# Patient Record
Sex: FEMALE | URBAN - METROPOLITAN AREA
[De-identification: names, ages, dates, MRNs, and addresses within clinical notes are randomized per-mention and may not be internally consistent; named-entity substitution may affect disease eponyms.]

---

## 2018-11-07 ENCOUNTER — APPOINTMENT (RX ONLY)
Dept: URBAN - METROPOLITAN AREA CLINIC 106 | Facility: CLINIC | Age: 38
Setting detail: DERMATOLOGY
End: 2018-11-07

## 2018-11-07 DIAGNOSIS — L30.9 DERMATITIS, UNSPECIFIED: ICD-10-CM

## 2018-11-07 DIAGNOSIS — D18.0 HEMANGIOMA: ICD-10-CM

## 2018-11-07 DIAGNOSIS — L70.0 ACNE VULGARIS: ICD-10-CM

## 2018-11-07 DIAGNOSIS — D22 MELANOCYTIC NEVI: ICD-10-CM

## 2018-11-07 PROBLEM — D18.01 HEMANGIOMA OF SKIN AND SUBCUTANEOUS TISSUE: Status: ACTIVE | Noted: 2018-11-07

## 2018-11-07 PROBLEM — D22.39 MELANOCYTIC NEVI OF OTHER PARTS OF FACE: Status: ACTIVE | Noted: 2018-11-07

## 2018-11-07 PROBLEM — D22.5 MELANOCYTIC NEVI OF TRUNK: Status: ACTIVE | Noted: 2018-11-07

## 2018-11-07 PROBLEM — D22.62 MELANOCYTIC NEVI OF LEFT UPPER LIMB, INCLUDING SHOULDER: Status: ACTIVE | Noted: 2018-11-07

## 2018-11-07 PROCEDURE — 99203 OFFICE O/P NEW LOW 30 MIN: CPT

## 2018-11-07 PROCEDURE — ? COUNSELING

## 2018-11-07 ASSESSMENT — LOCATION SIMPLE DESCRIPTION DERM
LOCATION SIMPLE: RIGHT CHEEK
LOCATION SIMPLE: RIGHT UPPER ARM
LOCATION SIMPLE: LEFT CHEEK
LOCATION SIMPLE: LEFT POSTERIOR UPPER ARM
LOCATION SIMPLE: RIGHT POSTERIOR UPPER ARM
LOCATION SIMPLE: ABDOMEN
LOCATION SIMPLE: CHEST
LOCATION SIMPLE: LEFT THIGH
LOCATION SIMPLE: LEFT UPPER ARM
LOCATION SIMPLE: UPPER BACK
LOCATION SIMPLE: RIGHT THIGH
LOCATION SIMPLE: RIGHT SUPERIOR EYELID

## 2018-11-07 ASSESSMENT — LOCATION DETAILED DESCRIPTION DERM
LOCATION DETAILED: LEFT PROXIMAL POSTERIOR UPPER ARM
LOCATION DETAILED: RIGHT LATERAL BUCCAL CHEEK
LOCATION DETAILED: RIGHT ANTERIOR PROXIMAL UPPER ARM
LOCATION DETAILED: LEFT ANTERIOR PROXIMAL UPPER ARM
LOCATION DETAILED: LEFT ANTERIOR PROXIMAL THIGH
LOCATION DETAILED: RIGHT LATERAL SUPERIOR EYELID
LOCATION DETAILED: PERIUMBILICAL SKIN
LOCATION DETAILED: LEFT LATERAL SUPERIOR CHEST
LOCATION DETAILED: SUPERIOR THORACIC SPINE
LOCATION DETAILED: LEFT CENTRAL BUCCAL CHEEK
LOCATION DETAILED: RIGHT PROXIMAL POSTERIOR UPPER ARM
LOCATION DETAILED: UPPER STERNUM
LOCATION DETAILED: RIGHT ANTERIOR DISTAL THIGH

## 2018-11-07 ASSESSMENT — LOCATION ZONE DERM
LOCATION ZONE: ARM
LOCATION ZONE: LEG
LOCATION ZONE: FACE
LOCATION ZONE: TRUNK
LOCATION ZONE: EYELID

## 2018-11-07 NOTE — PROCEDURE: COUNSELING
Detail Level: Simple
Patient Specific Counseling (Will Not Stick From Patient To Patient): Allergic contact dermatitis on eyelids - may try use test on inner arms for products to test. Use OTC hydrocortisone BID for one week only then stop.  Consider patch testing at [facility]
Detail Level: Detailed
Detail Level: Zone
Tetracycline Counseling: Patient counseled regarding possible photosensitivity and increased risk for sunburn. Patient instructed to avoid sunlight, if possible. When exposed to sunlight, patients should wear protective clothing, sunglasses, and sunscreen. The patient was instructed to call the office immediately if the following severe adverse effects occur:  hearing changes, easy bruising/bleeding, severe headache, or vision changes. The patient verbalized understanding of the proper use and possible adverse effects of tetracycline. All of the patient's questions and concerns were addressed. Patient understands to avoid pregnancy while on therapy due to potential birth defects.
Minocycline Counseling: Patient advised regarding possible photosensitivity and discoloration of the teeth, skin, lips, tongue and gums. Patient instructed to avoid sunlight, if possible. When exposed to sunlight, patients should wear protective clothing, sunglasses, and sunscreen. The patient was instructed to call the office immediately if the following severe adverse effects occur:  hearing changes, easy bruising/bleeding, severe headache, or vision changes. The patient verbalized understanding of the proper use and possible adverse effects of minocycline. All of the patient's questions and concerns were addressed.
Spironolactone Pregnancy And Lactation Text: This medication can cause feminization of the male fetus and should be avoided during pregnancy. The active metabolite is also found in breast milk.
Azithromycin Counseling:  I discussed with the patient the risks of azithromycin including but not limited to GI upset, allergic reaction, drug rash, diarrhea, and yeast infections.
Use Enhanced Medication Counseling?: No
Erythromycin Counseling:  I discussed with the patient the risks of erythromycin including but not limited to GI upset, allergic reaction, drug rash, diarrhea, increase in liver enzymes, and yeast infections.
Azithromycin Pregnancy And Lactation Text: This medication is considered safe during pregnancy and is also secreted in breast milk.
Dapsone Pregnancy And Lactation Text: This medication is Pregnancy Category C and is not considered safe during pregnancy or breast feeding.
Tazorac Pregnancy And Lactation Text: This medication is not safe during pregnancy. It is unknown if this medication is excreted in breast milk.
Spironolactone Counseling: Patient advised regarding risks of diarrhea, abdominal pain, hyperkalemia, birth defects (for female patients), liver toxicity and renal toxicity. The patient may need blood work to monitor liver and kidney function and potassium levels while on therapy. The patient verbalized understanding of the proper use and possible adverse effects of spironolactone. All of the patient's questions and concerns were addressed.
Bactrim Counseling:  I discussed with the patient the risks of sulfa antibiotics including but not limited to GI upset, allergic reaction, drug rash, diarrhea, dizziness, photosensitivity, and yeast infections. Rarely, more serious reactions can occur including but not limited to aplastic anemia, agranulocytosis, methemoglobinemia, blood dyscrasias, liver or kidney failure, lung infiltrates or desquamative/blistering drug rashes.
Topical Clindamycin Pregnancy And Lactation Text: This medication is Pregnancy Category B and is considered safe during pregnancy. It is unknown if it is excreted in breast milk.
Bactrim Pregnancy And Lactation Text: This medication is Pregnancy Category D and is known to cause fetal risk. It is also excreted in breast milk.
Isotretinoin Pregnancy And Lactation Text: This medication is Pregnancy Category X and is considered extremely dangerous during pregnancy. It is unknown if it is excreted in breast milk.
Tazorac Counseling:  Patient advised that medication is irritating and drying. Patient may need to apply sparingly and wash off after an hour before eventually leaving it on overnight. The patient verbalized understanding of the proper use and possible adverse effects of tazorac. All of the patient's questions and concerns were addressed.
Birth Control Pills Counseling: Birth Control Pill Counseling: I discussed with the patient the potential side effects of OCPs including but not limited to increased risk of stroke, heart attack, thrombophlebitis, deep venous thrombosis, hepatic adenomas, breast changes, GI upset, headaches, and depression. The patient verbalized understanding of the proper use and possible adverse effects of OCPs. All of the patient's questions and concerns were addressed.
Doxycycline Pregnancy And Lactation Text: This medication is Pregnancy Category D and not consider safe during pregnancy. It is also excreted in breast milk but is considered safe for shorter treatment courses.
Isotretinoin Counseling: Patient should get monthly blood tests, not donate blood, not drive at night if vision affected, not share medication, and not undergo elective surgery for 6 months after tx completed. Side effects reviewed, pt to contact office should one occur.
Topical Retinoid Pregnancy And Lactation Text: This medication is Pregnancy Category C. It is unknown if this medication is excreted in breast milk.
Benzoyl Peroxide Pregnancy And Lactation Text: This medication is Pregnancy Category C. It is unknown if benzoyl peroxide is excreted in breast milk.
Minocycline Pregnancy And Lactation Text: This medication is Pregnancy Category D and not consider safe during pregnancy. It is also excreted in breast milk.
High Dose Vitamin A Pregnancy And Lactation Text: High dose vitamin A therapy is contraindicated during pregnancy and breast feeding.
Topical Retinoid counseling:  Patient advised to apply a pea-sized amount only at bedtime and wait 30 minutes after washing their face before applying. If too drying, patient may add a non-comedogenic moisturizer. The patient verbalized understanding of the proper use and possible adverse effects of retinoids. All of the patient's questions and concerns were addressed.
Patient Specific Counseling (Will Not Stick From Patient To Patient): Pt recently been using skin rejuvenation product on her chest with irritation and breakout.
Erythromycin Pregnancy And Lactation Text: This medication is Pregnancy Category B and is considered safe during pregnancy. It is also excreted in breast milk.
Topical Clindamycin Counseling: Patient counseled that this medication may cause skin irritation or allergic reactions. In the event of skin irritation, the patient was advised to reduce the amount of the drug applied or use it less frequently. The patient verbalized understanding of the proper use and possible adverse effects of clindamycin. All of the patient's questions and concerns were addressed.
Dapsone Counseling: I discussed with the patient the risks of dapsone including but not limited to hemolytic anemia, agranulocytosis, rashes, methemoglobinemia, kidney failure, peripheral neuropathy, headaches, GI upset, and liver toxicity. Patients who start dapsone require monitoring including baseline LFTs and weekly CBCs for the first month, then every month thereafter. The patient verbalized understanding of the proper use and possible adverse effects of dapsone. All of the patient's questions and concerns were addressed.
Doxycycline Counseling:  Patient counseled regarding possible photosensitivity and increased risk for sunburn. Patient instructed to avoid sunlight, if possible. When exposed to sunlight, patients should wear protective clothing, sunglasses, and sunscreen. The patient was instructed to call the office immediately if the following severe adverse effects occur:  hearing changes, easy bruising/bleeding, severe headache, or vision changes. The patient verbalized understanding of the proper use and possible adverse effects of doxycycline. All of the patient's questions and concerns were addressed.
Topical Sulfur Applications Pregnancy And Lactation Text: This medication is Pregnancy Category C and has an unknown safety profile during pregnancy. It is unknown if this topical medication is excreted in breast milk.
High Dose Vitamin A Counseling: Side effects reviewed, pt to contact office should one occur.
Topical Sulfur Applications Counseling: Topical Sulfur Counseling: Patient counseled that this medication may cause skin irritation or allergic reactions. In the event of skin irritation, the patient was advised to reduce the amount of the drug applied or use it less frequently. The patient verbalized understanding of the proper use and possible adverse effects of topical sulfur application. All of the patient's questions and concerns were addressed.
Birth Control Pills Pregnancy And Lactation Text: This medication should be avoided if pregnant and for the first 30 days post-partum.
Benzoyl Peroxide Counseling: Patient counseled that medicine may cause skin irritation and bleach clothing. In the event of skin irritation, the patient was advised to reduce the amount of the drug applied or use it less frequently. The patient verbalized understanding of the proper use and possible adverse effects of benzoyl peroxide. All of the patient's questions and concerns were addressed.

## 2018-11-07 NOTE — HPI: FULL BODY SKIN EXAMINATION
How Severe Are Your Spot(S)?: mild
What Type Of Note Output Would You Prefer (Optional)?: Standard Output
What Is The Reason For Today's Visit?: Full Body Skin Examination
What Is The Reason For Today's Visit? (Being Monitored For X): concerning skin lesions on an annual basis
Additional History: Patient is here for full skin exam. Patient has area of concern, B/l eyelids RT > LT, area swells and is itchy with wearing of eye makeup, better when does not wear eye makeup. No treatment.

## 2019-11-06 ENCOUNTER — APPOINTMENT (RX ONLY)
Dept: URBAN - METROPOLITAN AREA CLINIC 106 | Facility: CLINIC | Age: 39
Setting detail: DERMATOLOGY
End: 2019-11-06

## 2019-11-06 DIAGNOSIS — L81.4 OTHER MELANIN HYPERPIGMENTATION: ICD-10-CM

## 2019-11-06 DIAGNOSIS — L82.0 INFLAMED SEBORRHEIC KERATOSIS: ICD-10-CM

## 2019-11-06 DIAGNOSIS — D22 MELANOCYTIC NEVI: ICD-10-CM

## 2019-11-06 DIAGNOSIS — L82.1 OTHER SEBORRHEIC KERATOSIS: ICD-10-CM

## 2019-11-06 DIAGNOSIS — D18.0 HEMANGIOMA: ICD-10-CM

## 2019-11-06 PROBLEM — D18.01 HEMANGIOMA OF SKIN AND SUBCUTANEOUS TISSUE: Status: ACTIVE | Noted: 2019-11-06

## 2019-11-06 PROBLEM — D22.9 MELANOCYTIC NEVI, UNSPECIFIED: Status: ACTIVE | Noted: 2019-11-06

## 2019-11-06 PROCEDURE — 99214 OFFICE O/P EST MOD 30 MIN: CPT | Mod: 25

## 2019-11-06 PROCEDURE — 17110 DESTRUCTION B9 LES UP TO 14: CPT

## 2019-11-06 PROCEDURE — ? LIQUID NITROGEN

## 2019-11-06 PROCEDURE — ? COUNSELING

## 2019-11-06 ASSESSMENT — LOCATION DETAILED DESCRIPTION DERM: LOCATION DETAILED: RIGHT INFERIOR MEDIAL UPPER BACK

## 2019-11-06 ASSESSMENT — LOCATION SIMPLE DESCRIPTION DERM: LOCATION SIMPLE: RIGHT UPPER BACK

## 2019-11-06 ASSESSMENT — LOCATION ZONE DERM: LOCATION ZONE: TRUNK

## 2019-11-06 NOTE — PROCEDURE: MIPS QUALITY
Additional Notes: Medications complete, pain 0-10
Detail Level: Detailed
Quality 130: Documentation Of Current Medications In The Medical Record: Current Medications Documented
Quality 131: Pain Assessment And Follow-Up: Pain assessment using a standardized tool is documented as negative, no follow-up plan required

## 2020-11-10 ENCOUNTER — APPOINTMENT (RX ONLY)
Dept: URBAN - METROPOLITAN AREA CLINIC 106 | Facility: CLINIC | Age: 40
Setting detail: DERMATOLOGY
End: 2020-11-10

## 2020-11-10 VITALS — TEMPERATURE: 97.7 F

## 2020-11-10 DIAGNOSIS — Z80.8 FAMILY HISTORY OF MALIGNANT NEOPLASM OF OTHER ORGANS OR SYSTEMS: ICD-10-CM

## 2020-11-10 DIAGNOSIS — L81.4 OTHER MELANIN HYPERPIGMENTATION: ICD-10-CM

## 2020-11-10 DIAGNOSIS — D18.0 HEMANGIOMA: ICD-10-CM

## 2020-11-10 DIAGNOSIS — D22 MELANOCYTIC NEVI: ICD-10-CM

## 2020-11-10 PROBLEM — D18.01 HEMANGIOMA OF SKIN AND SUBCUTANEOUS TISSUE: Status: ACTIVE | Noted: 2020-11-10

## 2020-11-10 PROBLEM — D22.9 MELANOCYTIC NEVI, UNSPECIFIED: Status: ACTIVE | Noted: 2020-11-10

## 2020-11-10 PROCEDURE — 99214 OFFICE O/P EST MOD 30 MIN: CPT

## 2020-11-10 PROCEDURE — ? COUNSELING

## 2020-11-10 NOTE — PROCEDURE: MIPS QUALITY
Additional Notes: Patient is not of age for the pneumonia vaccine
Quality 111:Pneumonia Vaccination Status For Older Adults: Pneumococcal Vaccination not Administered or Previously Received, Reason not Otherwise Specified
Quality 130: Documentation Of Current Medications In The Medical Record: Current Medications Documented
Detail Level: Detailed

## 2022-10-26 ENCOUNTER — APPOINTMENT (RX ONLY)
Dept: URBAN - METROPOLITAN AREA CLINIC 106 | Facility: CLINIC | Age: 42
Setting detail: DERMATOLOGY
End: 2022-10-26

## 2022-10-26 DIAGNOSIS — L91.8 OTHER HYPERTROPHIC DISORDERS OF THE SKIN: ICD-10-CM

## 2022-10-26 DIAGNOSIS — D22 MELANOCYTIC NEVI: ICD-10-CM

## 2022-10-26 DIAGNOSIS — L81.4 OTHER MELANIN HYPERPIGMENTATION: ICD-10-CM

## 2022-10-26 DIAGNOSIS — D36.1 BENIGN NEOPLASM OF PERIPHERAL NERVES AND AUTONOMIC NERVOUS SYSTEM: ICD-10-CM

## 2022-10-26 DIAGNOSIS — L72.0 EPIDERMAL CYST: ICD-10-CM

## 2022-10-26 DIAGNOSIS — Z71.89 OTHER SPECIFIED COUNSELING: ICD-10-CM

## 2022-10-26 DIAGNOSIS — D49.2 NEOPLASM OF UNSPECIFIED BEHAVIOR OF BONE, SOFT TISSUE, AND SKIN: ICD-10-CM

## 2022-10-26 DIAGNOSIS — L82.1 OTHER SEBORRHEIC KERATOSIS: ICD-10-CM

## 2022-10-26 PROBLEM — D22.5 MELANOCYTIC NEVI OF TRUNK: Status: ACTIVE | Noted: 2022-10-26

## 2022-10-26 PROBLEM — D36.14 BENIGN NEOPLASM OF PERIPHERAL NERVES AND AUTONOMIC NERVOUS SYSTEM OF THORAX: Status: ACTIVE | Noted: 2022-10-26

## 2022-10-26 PROCEDURE — ? SUNSCREEN RECOMMENDATIONS

## 2022-10-26 PROCEDURE — 11103 TANGNTL BX SKIN EA SEP/ADDL: CPT

## 2022-10-26 PROCEDURE — ? BIOPSY BY SHAVE METHOD

## 2022-10-26 PROCEDURE — 11102 TANGNTL BX SKIN SINGLE LES: CPT

## 2022-10-26 PROCEDURE — ? COUNSELING

## 2022-10-26 PROCEDURE — 99213 OFFICE O/P EST LOW 20 MIN: CPT | Mod: 25

## 2022-10-26 ASSESSMENT — LOCATION DETAILED DESCRIPTION DERM
LOCATION DETAILED: UPPER STERNUM
LOCATION DETAILED: LEFT MID-UPPER BACK
LOCATION DETAILED: RIGHT SUPERIOR UPPER BACK
LOCATION DETAILED: LEFT INFERIOR UPPER BACK
LOCATION DETAILED: RIGHT MID-UPPER BACK
LOCATION DETAILED: MIDDLE STERNUM
LOCATION DETAILED: INFERIOR THORACIC SPINE
LOCATION DETAILED: SUPERIOR THORACIC SPINE
LOCATION DETAILED: RIGHT SUPERIOR LATERAL NECK

## 2022-10-26 ASSESSMENT — LOCATION SIMPLE DESCRIPTION DERM
LOCATION SIMPLE: RIGHT UPPER BACK
LOCATION SIMPLE: RIGHT ANTERIOR NECK
LOCATION SIMPLE: UPPER BACK
LOCATION SIMPLE: CHEST
LOCATION SIMPLE: LEFT UPPER BACK

## 2022-10-26 ASSESSMENT — LOCATION ZONE DERM
LOCATION ZONE: TRUNK
LOCATION ZONE: NECK

## 2022-10-26 NOTE — PROCEDURE: BIOPSY BY SHAVE METHOD
Body Location Override (Optional - Billing Will Still Be Based On Selected Body Map Location If Applicable): right superior upper back
Detail Level: Detailed
Depth Of Biopsy: dermis
Was A Bandage Applied: Yes
Size Of Lesion In Cm: 0.5
X Size Of Lesion In Cm: 0
Biopsy Type: H and E
Biopsy Method: Dermablade
Anesthesia Type: 1% lidocaine with epinephrine
Hemostasis: Drysol
Wound Care: Petrolatum
Dressing: bandage
Destruction After The Procedure: No
Type Of Destruction Used: Curettage
Curettage Text: The wound bed was treated with curettage after the biopsy was performed.
Cryotherapy Text: The wound bed was treated with cryotherapy after the biopsy was performed.
Electrodesiccation Text: The wound bed was treated with electrodesiccation after the biopsy was performed.
Electrodesiccation And Curettage Text: The wound bed was treated with electrodesiccation and curettage after the biopsy was performed.
Silver Nitrate Text: The wound bed was treated with silver nitrate after the biopsy was performed.
Lab: [id number]
Consent: Written consent was obtained and risks were reviewed including but not limited to scarring, infection, bleeding, scabbing, incomplete removal, nerve damage and allergy to anesthesia.
Post-Care Instructions: I reviewed with the patient in detail post-care instructions. Patient is to keep the biopsy site dry overnight, and then apply bacitracin twice daily until healed. Patient may apply hydrogen peroxide soaks to remove any crusting. written instructions provided
Notification Instructions: Patient will be notified of biopsy results. However, patient instructed to call the office if not contacted within 2 weeks.
Billing Type: [employer]
Information: Selecting Yes will display possible errors in your note based on the variables you have selected. This validation is only offered as a suggestion for you. PLEASE NOTE THAT THE VALIDATION TEXT WILL BE REMOVED WHEN YOU FINALIZE YOUR NOTE. IF YOU WANT TO FAX A PRELIMINARY NOTE YOU WILL NEED TO TOGGLE THIS TO 'NO' IF YOU DO NOT WANT IT IN YOUR FAXED NOTE.
Body Location Override (Optional - Billing Will Still Be Based On Selected Body Map Location If Applicable): right inferior upper back
Size Of Lesion In Cm: 0.4
Lab: [facility]
Lab Facility: [id number]
Post-Care Instructions: I reviewed with the patient in detail post-care instructions. Patient is to keep the biopsy site dry overnight, and then apply bacitracin twice daily until healed. Patient may apply hydrogen peroxide soaks to remove any crusting.written instructions provided
Billing Type: Third-Party Bill

## 2023-02-14 ENCOUNTER — APPOINTMENT (RX ONLY)
Dept: URBAN - METROPOLITAN AREA CLINIC 106 | Facility: CLINIC | Age: 43
Setting detail: DERMATOLOGY
End: 2023-02-14

## 2023-02-14 DIAGNOSIS — A49.02 METHICILLIN RESISTANT STAPHYLOCOCCUS AUREUS INFECTION, UNSPECIFIED SITE: ICD-10-CM

## 2023-02-14 DIAGNOSIS — L85.3 XEROSIS CUTIS: ICD-10-CM

## 2023-02-14 PROCEDURE — ? COUNSELING

## 2023-02-14 PROCEDURE — ? PRESCRIPTION

## 2023-02-14 PROCEDURE — ? ORDER TESTS

## 2023-02-14 PROCEDURE — ? PHOTO-DOCUMENTATION

## 2023-02-14 PROCEDURE — ? ADDITIONAL NOTES

## 2023-02-14 PROCEDURE — 99213 OFFICE O/P EST LOW 20 MIN: CPT

## 2023-02-14 RX ORDER — FLUOCINONIDE 0.5 MG/G
1 OINTMENT TOPICAL BID
Qty: 15 | Refills: 0 | Status: ERX | COMMUNITY
Start: 2023-02-14

## 2023-02-14 RX ADMIN — FLUOCINONIDE 1: 0.5 OINTMENT TOPICAL at 00:00

## 2023-02-14 ASSESSMENT — LOCATION ZONE DERM: LOCATION ZONE: LEG

## 2023-02-14 ASSESSMENT — LOCATION DETAILED DESCRIPTION DERM
LOCATION DETAILED: RIGHT PROXIMAL PRETIBIAL REGION
LOCATION DETAILED: RIGHT DISTAL PRETIBIAL REGION
LOCATION DETAILED: LEFT PROXIMAL PRETIBIAL REGION

## 2023-02-14 ASSESSMENT — LOCATION SIMPLE DESCRIPTION DERM
LOCATION SIMPLE: RIGHT PRETIBIAL REGION
LOCATION SIMPLE: LEFT PRETIBIAL REGION

## 2023-02-14 NOTE — PROCEDURE: ORDER TESTS
Billing Type: [employer]
Performing Laboratory: [id number]
Bill For Surgical Tray: no
Lab Facility: 0
Expected Date Of Service: 02/14/2023
Clinical Notes (To The Lab): please identify any questionable organisms and do a sensitivity

## 2023-02-14 NOTE — PROCEDURE: ADDITIONAL NOTES
Additional Notes: Advised to wash with hibiclens \\nRecommended to apply Vaseline after shower
Render Risk Assessment In Note?: no
Detail Level: Simple

## 2023-02-28 ENCOUNTER — APPOINTMENT (RX ONLY)
Dept: URBAN - METROPOLITAN AREA CLINIC 106 | Facility: CLINIC | Age: 43
Setting detail: DERMATOLOGY
End: 2023-02-28

## 2023-02-28 DIAGNOSIS — L85.3 XEROSIS CUTIS: ICD-10-CM

## 2023-02-28 DIAGNOSIS — L30.0 NUMMULAR DERMATITIS: ICD-10-CM | Status: RESOLVING

## 2023-02-28 PROCEDURE — ? ADDITIONAL NOTES

## 2023-02-28 PROCEDURE — ? COUNSELING

## 2023-02-28 PROCEDURE — 99213 OFFICE O/P EST LOW 20 MIN: CPT

## 2023-02-28 ASSESSMENT — LOCATION DETAILED DESCRIPTION DERM
LOCATION DETAILED: RIGHT DISTAL PRETIBIAL REGION
LOCATION DETAILED: RIGHT LATERAL DISTAL PRETIBIAL REGION

## 2023-02-28 ASSESSMENT — LOCATION ZONE DERM: LOCATION ZONE: LEG

## 2023-02-28 ASSESSMENT — LOCATION SIMPLE DESCRIPTION DERM: LOCATION SIMPLE: RIGHT PRETIBIAL REGION

## 2023-02-28 NOTE — PROCEDURE: ADDITIONAL NOTES
Additional Notes: Advised to apply Vaseline after shower
Detail Level: Simple
Render Risk Assessment In Note?: no
Additional Notes: recommend continuing topical treatment x 1 more week with fluocinonide 0.05 % Topical - ointment Bid

## 2023-06-06 ENCOUNTER — APPOINTMENT (RX ONLY)
Dept: URBAN - METROPOLITAN AREA CLINIC 106 | Facility: CLINIC | Age: 43
Setting detail: DERMATOLOGY
End: 2023-06-06

## 2023-06-06 DIAGNOSIS — A49.02 METHICILLIN RESISTANT STAPHYLOCOCCUS AUREUS INFECTION, UNSPECIFIED SITE: ICD-10-CM

## 2023-06-06 DIAGNOSIS — L30.9 DERMATITIS, UNSPECIFIED: ICD-10-CM

## 2023-06-06 PROCEDURE — ? COUNSELING

## 2023-06-06 PROCEDURE — ? ORDER TESTS

## 2023-06-06 PROCEDURE — 99213 OFFICE O/P EST LOW 20 MIN: CPT

## 2023-06-06 PROCEDURE — ? ADDITIONAL NOTES

## 2023-06-06 PROCEDURE — ? PRESCRIPTION

## 2023-06-06 RX ORDER — AMOXICILLIN AND CLAVULANATE POTASSIUM 500; 125 MG/1; 1/1
1 TABLET, FILM COATED ORAL BID
Qty: 28 | Refills: 0 | Status: ERX | COMMUNITY
Start: 2023-06-06

## 2023-06-06 RX ORDER — MUPIROCIN 20 MG/G
1 OINTMENT TOPICAL BID
Qty: 15 | Refills: 0 | Status: CANCELLED

## 2023-06-06 RX ADMIN — AMOXICILLIN AND CLAVULANATE POTASSIUM 1: 500; 125 TABLET, FILM COATED ORAL at 00:00

## 2023-06-06 ASSESSMENT — LOCATION SIMPLE DESCRIPTION DERM
LOCATION SIMPLE: RIGHT PRETIBIAL REGION
LOCATION SIMPLE: RIGHT THIGH

## 2023-06-06 ASSESSMENT — LOCATION DETAILED DESCRIPTION DERM
LOCATION DETAILED: RIGHT PROXIMAL PRETIBIAL REGION
LOCATION DETAILED: RIGHT ANTERIOR DISTAL THIGH

## 2023-06-06 ASSESSMENT — LOCATION ZONE DERM: LOCATION ZONE: LEG

## 2023-06-06 NOTE — PROCEDURE: ADDITIONAL NOTES
Additional Notes: Recommend stop using all topical products. \\nThrow out any contaminated products\\nReturn for possible biopsies of the rash
Detail Level: Detailed
Render Risk Assessment In Note?: no

## 2023-06-06 NOTE — PROCEDURE: ORDER TESTS
Billing Type: [employer]
Performing Laboratory: [id number]
Expected Date Of Service: 06/06/2023
Lab Facility: 0
Bill For Surgical Tray: no
Clinical Notes (To The Lab): please identify any questionable organisms and do a sensitivity test

## 2023-06-07 ENCOUNTER — RX ONLY (OUTPATIENT)
Age: 43
Setting detail: RX ONLY
End: 2023-06-07

## 2023-06-07 RX ORDER — AMOXICILLIN AND CLAVULANATE POTASSIUM 500; 125 MG/1; 1/1
1 TABLET, FILM COATED ORAL BID
Qty: 28 | Refills: 0 | Status: ERX

## 2023-06-14 ENCOUNTER — APPOINTMENT (RX ONLY)
Dept: URBAN - METROPOLITAN AREA CLINIC 106 | Facility: CLINIC | Age: 43
Setting detail: DERMATOLOGY
End: 2023-06-14

## 2023-06-14 DIAGNOSIS — L08.9 LOCAL INFECTION OF THE SKIN AND SUBCUTANEOUS TISSUE, UNSPECIFIED: ICD-10-CM | Status: STABLE

## 2023-06-14 DIAGNOSIS — L30.2 CUTANEOUS AUTOSENSITIZATION: ICD-10-CM

## 2023-06-14 DIAGNOSIS — L29.89 OTHER PRURITUS: ICD-10-CM

## 2023-06-14 PROBLEM — L29.8 OTHER PRURITUS: Status: ACTIVE | Noted: 2023-06-14

## 2023-06-14 PROCEDURE — ? PRESCRIPTION

## 2023-06-14 PROCEDURE — ? COUNSELING

## 2023-06-14 PROCEDURE — 99213 OFFICE O/P EST LOW 20 MIN: CPT

## 2023-06-14 PROCEDURE — ? MEDICATION COUNSELING

## 2023-06-14 PROCEDURE — ? TREATMENT REGIMEN

## 2023-06-14 RX ORDER — FLUOCINONIDE 0.5 MG/G
1 OINTMENT TOPICAL BID
Qty: 60 | Refills: 0 | Status: ERX

## 2023-06-14 RX ORDER — CLINDAMYCIN HYDROCHLORIDE 300 MG/1
1 CAPSULE ORAL BID
Qty: 20 | Refills: 0 | Status: ERX | COMMUNITY
Start: 2023-06-14

## 2023-06-14 RX ADMIN — CLINDAMYCIN HYDROCHLORIDE 1: 300 CAPSULE ORAL at 00:00

## 2023-06-14 ASSESSMENT — LOCATION SIMPLE DESCRIPTION DERM
LOCATION SIMPLE: RIGHT THIGH
LOCATION SIMPLE: RIGHT PRETIBIAL REGION

## 2023-06-14 ASSESSMENT — LOCATION DETAILED DESCRIPTION DERM
LOCATION DETAILED: RIGHT DISTAL PRETIBIAL REGION
LOCATION DETAILED: RIGHT ANTERIOR DISTAL THIGH

## 2023-06-14 ASSESSMENT — LOCATION ZONE DERM: LOCATION ZONE: LEG

## 2023-06-14 NOTE — PROCEDURE: TREATMENT REGIMEN
Initiate Treatment: The patient was instructed to apply the topical steroid under occlusion for a few hours a day
Detail Level: Simple

## 2023-06-14 NOTE — PROCEDURE: MEDICATION COUNSELING
Oral Minoxidil Pregnancy And Lactation Text: This medication should only be used when clearly needed if you are pregnant, attempting to become pregnant or breast feeding.
Griseofulvin Counseling:  I discussed with the patient the risks of griseofulvin including but not limited to photosensitivity, cytopenia, liver damage, nausea/vomiting and severe allergy. The patient understands that this medication is best absorbed when taken with a fatty meal (e.g., ice cream or french fries).
Hydroquinone Pregnancy And Lactation Text: This medication has not been assigned a Pregnancy Risk Category but animal studies failed to show danger with the topical medication. It is unknown if the medication is excreted in breast milk.
Winlevi Counseling:  I discussed with the patient the risks of topical clascoterone including but not limited to erythema, scaling, itching, and stinging. Patient voiced their understanding.
Enbrel Counseling:  I discussed with the patient the risks of etanercept including but not limited to myelosuppression, immunosuppression, autoimmune hepatitis, demyelinating diseases, lymphoma, and infections. The patient understands that monitoring is required including a PPD at baseline and must alert us or the primary physician if symptoms of infection or other concerning signs are noted.
Minocycline Pregnancy And Lactation Text: This medication is Pregnancy Category D and not consider safe during pregnancy. It is also excreted in breast milk.
Finasteride Pregnancy And Lactation Text: This medication is absolutely contraindicated during pregnancy. It is unknown if it is excreted in breast milk.
Clindamycin Counseling: I counseled the patient regarding use of clindamycin as an antibiotic for prophylactic and/or therapeutic purposes. Clindamycin is active against numerous classes of bacteria, including skin bacteria. Side effects may include nausea, diarrhea, gastrointestinal upset, rash, hives, yeast infections, and in rare cases, colitis.
Aklief Pregnancy And Lactation Text: It is unknown if this medication is safe to use during pregnancy. It is unknown if this medication is excreted in breast milk. Breastfeeding women should use the topical cream on the smallest area of the skin for the shortest time needed while breastfeeding. Do not apply to nipple and areola.
Libtayo Pregnancy And Lactation Text: This medication is contraindicated in pregnancy and when breast feeding.
5-Fu Counseling: 5-Fluorouracil Counseling:  I discussed with the patient the risks of 5-fluorouracil including but not limited to erythema, scaling, itching, weeping, crusting, and pain.
Topical Ketoconazole Pregnancy And Lactation Text: This medication is Pregnancy Category B and is considered safe during pregnancy. It is unknown if it is excreted in breast milk.
Include Pregnancy/Lactation Warning?: No
Cyclosporine Counseling:  I discussed with the patient the risks of cyclosporine including but not limited to hypertension, gingival hyperplasia,myelosuppression, immunosuppression, liver damage, kidney damage, neurotoxicity, lymphoma, and serious infections. The patient understands that monitoring is required including baseline blood pressure, CBC, CMP, lipid panel and uric acid, and then 1-2 times monthly CMP and blood pressure.
Acitretin Pregnancy And Lactation Text: This medication is Pregnancy Category X and should not be given to women who are pregnant or may become pregnant in the future. This medication is excreted in breast milk.
Soolantra Counseling: I discussed with the patients the risks of topial Soolantra. This is a medicine which decreases the number of mites and inflammation in the skin. You experience burning, stinging, eye irritation or allergic reactions. Please call our office if you develop any problems from using this medication.
Cimetidine Pregnancy And Lactation Text: This medication is Pregnancy Category B and is considered safe during pregnancy. It is also excreted in breast milk and breast feeding isn't recommended.
Olumiant Pregnancy And Lactation Text: Based on animal studies, Manuel Donna may cause embryo-fetal harm when administered to pregnant women. The medication should not be used in pregnancy. Breastfeeding is not recommended during treatment.
Opioid Pregnancy And Lactation Text: These medications can lead to premature delivery and should be avoided during pregnancy. These medications are also present in breast milk in small amounts.
Picato Pregnancy And Lactation Text: This medication is Pregnancy Category C. It is unknown if this medication is excreted in breast milk.
Niacinamide Pregnancy And Lactation Text: These medications are considered safe during pregnancy.
Thalidomide Pregnancy And Lactation Text: This medication is Pregnancy Category X and is absolutely contraindicated during pregnancy. It is unknown if it is excreted in breast milk.
Gabapentin Counseling: I discussed with the patient the risks of gabapentin including but not limited to dizziness, somnolence, fatigue and ataxia.
Taltz Pregnancy And Lactation Text: The risk during pregnancy and breastfeeding is uncertain with this medication.
Quinolones Counseling:  I discussed with the patient the risks of fluoroquinolones including but not limited to GI upset, allergic reaction, drug rash, diarrhea, dizziness, photosensitivity, yeast infections, liver function test abnormalities, tendonitis/tendon rupture.
Imiquimod Counseling:  I discussed with the patient the risks of imiquimod including but not limited to erythema, scaling, itching, weeping, crusting, and pain. Patient understands that the inflammatory response to imiquimod is variable from person to person and was educated regarded proper titration schedule. If flu-like symptoms develop, patient knows to discontinue the medication and contact us.
Fluconazole Pregnancy And Lactation Text: This medication is Pregnancy Category C and it isn't know if it is safe during pregnancy. It is also excreted in breast milk.
Birth Control Pills Counseling: Birth Control Pill Counseling: I discussed with the patient the potential side effects of OCPs including but not limited to increased risk of stroke, heart attack, thrombophlebitis, deep venous thrombosis, hepatic adenomas, breast changes, GI upset, headaches, and depression. The patient verbalized understanding of the proper use and possible adverse effects of OCPs. All of the patient's questions and concerns were addressed.
Otezla Counseling: Darrelyn Viry Counseling: The side effects of Darrelyn Viry were discussed with the patient, including but not limited to worsening or new depression, weight loss, diarrhea, nausea, upper respiratory tract infection, and headache. Patient instructed to call the office should any adverse effect occur. The patient verbalized understanding of the proper use and possible adverse effects of Otezla. All the patient's questions and concerns were addressed.
Siliq Counseling:  I discussed with the patient the risks of Siliq including but not limited to new or worsening depression, suicidal thoughts and behavior, immunosuppression, malignancy, posterior leukoencephalopathy syndrome, and serious infections. The patient understands that monitoring is required including a PPD at baseline and must alert us or the primary physician if symptoms of infection or other concerning signs are noted. There is also a special program designed to monitor depression which is required with Siliq.
Cyclosporine Pregnancy And Lactation Text: This medication is Pregnancy Category C and it isn't know if it is safe during pregnancy. This medication is excreted in breast milk.
Topical Metronidazole Counseling: Metronidazole is a topical antibiotic medication. You may experience burning, stinging, redness, or allergic reactions. Please call our office if you develop any problems from using this medication.
Bexarotene Counseling:  I discussed with the patient the risks of bexarotene including but not limited to hair loss, dry lips/skin/eyes, liver abnormalities, hyperlipidemia, pancreatitis, depression/suicidal ideation, photosensitivity, drug rash/allergic reactions, hypothyroidism, anemia, leukopenia, infection, cataracts, and teratogenicity. Patient understands that they will need regular blood tests to check lipid profile, liver function tests, white blood cell count, thyroid function tests and pregnancy test if applicable.
Clindamycin Pregnancy And Lactation Text: This medication can be used in pregnancy if certain situations. Clindamycin is also present in breast milk.
5-Fu Pregnancy And Lactation Text: This medication is Pregnancy Category X and contraindicated in pregnancy and in women who may become pregnant. It is unknown if this medication is excreted in breast milk.
Adbry Counseling: I discussed with the patient the risks of tralokinumab including but not limited to eye infection and irritation, cold sores, injection site reactions, worsening of asthma, allergic reactions and increased risk of parasitic infection. Live vaccines should be avoided while taking tralokinumab. The patient understands that monitoring is required and they must alert us or the primary physician if symptoms of infection or other concerning signs are noted.
Enbrel Pregnancy And Lactation Text: This medication is Pregnancy Category B and is considered safe during pregnancy. It is unknown if this medication is excreted in breast milk.
Odomzo Counseling- I discussed with the patient the risks of Odomzo including but not limited to nausea, vomiting, diarrhea, constipation, weight loss, changes in the sense of taste, decreased appetite, muscle spasms, and hair loss. The patient verbalized understanding of the proper use and possible adverse effects of Odomzo. All of the patient's questions and concerns were addressed.
Winlevi Pregnancy And Lactation Text: This medication is considered safe during pregnancy and breastfeeding.
Soolantra Pregnancy And Lactation Text: This medication is Pregnancy Category C. This medication is considered safe during breast feeding.
Azelaic Acid Counseling: Patient counseled that medicine may cause skin irritation and to avoid applying near the eyes. In the event of skin irritation, the patient was advised to reduce the amount of the drug applied or use it less frequently. The patient verbalized understanding of the proper use and possible adverse effects of azelaic acid. All of the patient's questions and concerns were addressed.
Doxepin Counseling:  Patient advised that the medication is sedating and not to drive a car after taking this medication. Patient informed of potential adverse effects including but not limited to dry mouth, urinary retention, and blurry vision. The patient verbalized understanding of the proper use and possible adverse effects of doxepin. All of the patient's questions and concerns were addressed.
Rinvoq Counseling: I discussed with the patient the risks of Rinvoq therapy including but not limited to upper respiratory tract infections, shingles, cold sores, bronchitis, nausea, cough, fever, acne, and headache. Live vaccines should be avoided. This medication has been linked to serious infections; higher rate of mortality; malignancy and lymphoproliferative disorders; major adverse cardiovascular events; thrombosis; thrombocytopenia, anemia, and neutropenia; lipid elevations; liver enzyme elevations; and gastrointestinal perforations.
Protopic Counseling: Patient may experience a mild burning sensation during topical application. Protopic is not approved in children less than 3years of age. There have been case reports of hematologic and skin malignancies in patients using topical calcineurin inhibitors although causality is questionable.
Tranexamic Acid Counseling:  Patient advised of the small risk of bleeding problems with tranexamic acid. They were also instructed to call if they developed any nausea, vomiting or diarrhea. All of the patient's questions and concerns were addressed.
Gabapentin Pregnancy And Lactation Text: This medication is Pregnancy Category C and isn't considered safe during pregnancy. It is excreted in breast milk.
Tremfya Counseling: I discussed with the patient the risks of guselkumab including but not limited to immunosuppression, serious infections, and drug reactions. The patient understands that monitoring is required including a PPD at baseline and must alert us or the primary physician if symptoms of infection or other concerning signs are noted.
Nsaids Counseling: NSAID Counseling: I discussed with the patient that NSAIDs should be taken with food. Prolonged use of NSAIDs can result in the development of stomach ulcers. Patient advised to stop taking NSAIDs if abdominal pain occurs. The patient verbalized understanding of the proper use and possible adverse effects of NSAIDs. All of the patient's questions and concerns were addressed.
Birth Control Pills Pregnancy And Lactation Text: This medication should be avoided if pregnant and for the first 30 days post-partum.
Fluconazole Counseling:  Patient counseled regarding adverse effects of fluconazole including but not limited to headache, diarrhea, nausea, upset stomach, liver function test abnormalities, taste disturbance, and stomach pain. There is a rare possibility of liver failure that can occur when taking fluconazole. The patient understands that monitoring of LFTs and kidney function test may be required, especially at baseline. The patient verbalized understanding of the proper use and possible adverse effects of fluconazole. All of the patient's questions and concerns were addressed.
Otezla Pregnancy And Lactation Text: This medication is Pregnancy Category C and it isn't known if it is safe during pregnancy. It is unknown if it is excreted in breast milk.
Drysol Counseling:  I discussed with the patient the risks of drysol/aluminum chloride including but not limited to skin rash, itching, irritation, burning.
Arava Counseling:  Patient counseled regarding adverse effects of Arava including but not limited to nausea, vomiting, abnormalities in liver function tests. Patients may develop mouth sores, rash, diarrhea, and abnormalities in blood counts. The patient understands that monitoring is required including LFTs and blood counts. There is a rare possibility of scarring of the liver and lung problems that can occur when taking methotrexate. Persistent nausea, loss of appetite, pale stools, dark urine, cough, and shortness of breath should be reported immediately. Patient advised to discontinue Arava treatment and consult with a physician prior to attempting conception. The patient will have to undergo a treatment to eliminate Arava from the body prior to conception.
Doxycycline Counseling:  Patient counseled regarding possible photosensitivity and increased risk for sunburn. Patient instructed to avoid sunlight, if possible. When exposed to sunlight, patients should wear protective clothing, sunglasses, and sunscreen. The patient was instructed to call the office immediately if the following severe adverse effects occur:  hearing changes, easy bruising/bleeding, severe headache, or vision changes. The patient verbalized understanding of the proper use and possible adverse effects of doxycycline. All of the patient's questions and concerns were addressed.
Itraconazole Counseling:  I discussed with the patient the risks of itraconazole including but not limited to liver damage, nausea/vomiting, neuropathy, and severe allergy. The patient understands that this medication is best absorbed when taken with acidic beverages such as non-diet cola or ginger ale. The patient understands that monitoring is required including baseline LFTs and repeat LFTs at intervals. The patient understands that they are to contact us or the primary physician if concerning signs are noted.
Methotrexate Counseling:  Patient counseled regarding adverse effects of methotrexate including but not limited to nausea, vomiting, abnormalities in liver function tests. Patients may develop mouth sores, rash, diarrhea, and abnormalities in blood counts. The patient understands that monitoring is required including LFT's and blood counts. There is a rare possibility of scarring of the liver and lung problems that can occur when taking methotrexate. Persistent nausea, loss of appetite, pale stools, dark urine, cough, and shortness of breath should be reported immediately. Patient advised to discontinue methotrexate treatment at least three months before attempting to become pregnant. I discussed the need for folate supplements while taking methotrexate. These supplements can decrease side effects during methotrexate treatment. The patient verbalized understanding of the proper use and possible adverse effects of methotrexate. All of the patient's questions and concerns were addressed.
Bexarotene Pregnancy And Lactation Text: This medication is Pregnancy Category X and should not be given to women who are pregnant or may become pregnant. This medication should not be used if you are breast feeding.
VTAMA Counseling: I discussed with the patient that Saige Jewels is not for use in the eyes, mouth or mouth. They should call the office if they develop any signs of allergic reactions to Saige Jewels. The patient verbalized understanding of the proper use and possible adverse effects of VTAMA. All of the patient's questions and concerns were addressed.
Azelaic Acid Pregnancy And Lactation Text: This medication is considered safe during pregnancy and breast feeding.
Humira Counseling:  I discussed with the patient the risks of adalimumab including but not limited to myelosuppression, immunosuppression, autoimmune hepatitis, demyelinating diseases, lymphoma, and serious infections. The patient understands that monitoring is required including a PPD at baseline and must alert us or the primary physician if symptoms of infection or other concerning signs are noted.
Topical Retinoid counseling:  Patient advised to apply a pea-sized amount only at bedtime and wait 30 minutes after washing their face before applying. If too drying, patient may add a non-comedogenic moisturizer. The patient verbalized understanding of the proper use and possible adverse effects of retinoids. All of the patient's questions and concerns were addressed.
Rinvoq Pregnancy And Lactation Text: Based on animal studies, Rinvoq may cause embryo-fetal harm when administered to pregnant women. The medication should not be used in pregnancy. Breastfeeding is not recommended during treatment and for 6 days after the last dose.
Doxepin Pregnancy And Lactation Text: This medication is Pregnancy Category C and it isn't known if it is safe during pregnancy. It is also excreted in breast milk and breast feeding isn't recommended.
Topical Metronidazole Pregnancy And Lactation Text: This medication is Pregnancy Category B and considered safe during pregnancy. It is also considered safe to use while breastfeeding.
Adbry Pregnancy And Lactation Text: It is unknown if this medication will adversely affect pregnancy or breast feeding.
Opzelura Counseling:  I discussed with the patient the risks of Zohra Gong including but not limited to nasopharngitis, bronchitis, ear infection, eosinophila, hives, diarrhea, folliculitis, tonsillitis, and rhinorrhea. Taken orally, this medication has been linked to serious infections; higher rate of mortality; malignancy and lymphoproliferative disorders; major adverse cardiovascular events; thrombosis; thrombocytopenia, anemia, and neutropenia; and lipid elevations.
Protopic Pregnancy And Lactation Text: This medication is Pregnancy Category C. It is unknown if this medication is excreted in breast milk when applied topically.
Nsaids Pregnancy And Lactation Text: These medications are considered safe up to 30 weeks gestation. It is excreted in breast milk.
Oxybutynin Counseling:  I discussed with the patient the risks of oxybutynin including but not limited to skin rash, drowsiness, dry mouth, difficulty urinating, and blurred vision.
Rifampin Counseling: I discussed with the patient the risks of rifampin including but not limited to liver damage, kidney damage, red-orange body fluids, nausea/vomiting and severe allergy.
Simponi Counseling:  I discussed with the patient the risks of golimumab including but not limited to myelosuppression, immunosuppression, autoimmune hepatitis, demyelinating diseases, lymphoma, and serious infections. The patient understands that monitoring is required including a PPD at baseline and must alert us or the primary physician if symptoms of infection or other concerning signs are noted.
Tranexamic Acid Pregnancy And Lactation Text: It is unknown if this medication is safe during pregnancy or breast feeding.
Spironolactone Counseling: Patient advised regarding risks of diarrhea, abdominal pain, hyperkalemia, birth defects (for female patients), liver toxicity and renal toxicity. The patient may need blood work to monitor liver and kidney function and potassium levels while on therapy. The patient verbalized understanding of the proper use and possible adverse effects of spironolactone. All of the patient's questions and concerns were addressed.
Methotrexate Pregnancy And Lactation Text: This medication is Pregnancy Category X and is known to cause fetal harm. This medication is excreted in breast milk.
Klisyri Counseling:  I discussed with the patient the risks of Herlinda Pa including but not limited to erythema, scaling, itching, weeping, crusting, and pain.
Glycopyrrolate Counseling:  I discussed with the patient the risks of glycopyrrolate including but not limited to skin rash, drowsiness, dry mouth, difficulty urinating, and blurred vision.
Doxycycline Pregnancy And Lactation Text: This medication is Pregnancy Category D and not consider safe during pregnancy. It is also excreted in breast milk but is considered safe for shorter treatment courses.
Benzoyl Peroxide Counseling: Patient counseled that medicine may cause skin irritation and bleach clothing. In the event of skin irritation, the patient was advised to reduce the amount of the drug applied or use it less frequently. The patient verbalized understanding of the proper use and possible adverse effects of benzoyl peroxide. All of the patient's questions and concerns were addressed.
Azithromycin Counseling:  I discussed with the patient the risks of azithromycin including but not limited to GI upset, allergic reaction, drug rash, diarrhea, and yeast infections.
Vtama Pregnancy And Lactation Text: It is unknown if this medication can cause problems during pregnancy and breastfeeding.
Isotretinoin Counseling: Patient should get monthly blood tests, not donate blood, not drive at night if vision affected, not share medication, and not undergo elective surgery for 6 months after tx completed. Side effects reviewed, pt to contact office should one occur.
Cimzia Counseling:  I discussed with the patient the risks of Cimzia including but not limited to immunosuppression, allergic reactions and infections. The patient understands that monitoring is required including a PPD at baseline and must alert us or the primary physician if symptoms of infection or other concerning signs are noted.
Topical Steroids Counseling: I discussed with the patient that prolonged use of topical steroids can result in the increased appearance of superficial blood vessels (telangiectasias), lightening (hypopigmentation) and thinning of the skin (atrophy). Patient understands to avoid using high potency steroids in skin folds, the groin or the face. The patient verbalized understanding of the proper use and possible adverse effects of topical steroids. All of the patient's questions and concerns were addressed.
Mirvaso Pregnancy And Lactation Text: This medication has not been assigned a Pregnancy Risk Category. It is unknown if the medication is excreted in breast milk.
Hydroxyzine Counseling: Patient advised that the medication is sedating and not to drive a car after taking this medication. Patient informed of potential adverse effects including but not limited to dry mouth, urinary retention, and blurry vision. The patient verbalized understanding of the proper use and possible adverse effects of hydroxyzine. All of the patient's questions and concerns were addressed.
Sotyktu Counseling:  I discussed the most common side effects of Sotyktu including: common cold, sore throat, sinus infections, cold sores, canker sores, folliculitis, and acne. Â  I also discussed more serious side effects of Sotyktu including but not limited to: serious allergic reactions; increased risk for infections such as TB; cancers such as lymphomas; rhabdomyolysis and elevated CPK; and elevated triglycerides and liver enzymes. Â 
Qbrexza Counseling:  I discussed with the patient the risks of Penelope Claudia including but not limited to headache, mydriasis, blurred vision, dry eyes, nasal dryness, dry mouth, dry throat, dry skin, urinary hesitation, and constipation. Local skin reactions including erythema, burning, stinging, and itching can also occur.
Dutasteride Counseling: Dustasteride Counseling:  I discussed with the patient the risks of use of dutasteride including but not limited to decreased libido, decreased ejaculate volume, and gynecomastia. Women who can become pregnant should not handle medication. All of the patient's questions and concerns were addressed.
Valtrex Counseling: I discussed with the patient the risks of valacyclovir including but not limited to kidney damage, nausea, vomiting and severe allergy. The patient understands that if the infection seems to be worsening or is not improving, they are to call.
Xolair Counseling:  Patient informed of potential adverse effects including but not limited to fever, muscle aches, rash and allergic reactions. The patient verbalized understanding of the proper use and possible adverse effects of Xolair. All of the patient's questions and concerns were addressed.
Ketoconazole Counseling:   Patient counseled regarding improving absorption with orange juice. Adverse effects include but are not limited to breast enlargement, headache, diarrhea, nausea, upset stomach, liver function test abnormalities, taste disturbance, and stomach pain. There is a rare possibility of liver failure that can occur when taking ketoconazole. The patient understands that monitoring of LFTs may be required, especially at baseline. The patient verbalized understanding of the proper use and possible adverse effects of ketoconazole. All of the patient's questions and concerns were addressed.
Glycopyrrolate Pregnancy And Lactation Text: This medication is Pregnancy Category B and is considered safe during pregnancy. It is unknown if it is excreted breast milk.
Klisyri Pregnancy And Lactation Text: It is unknown if this medication can harm a developing fetus or if it is excreted in breast milk.
Zoryve Counseling:  I discussed with the patient that Dewanda Holes is not for use in the eyes, mouth or vagina. The most commonly reported side effects include diarrhea, headache, insomnia, application site pain, upper respiratory tract infections, and urinary tract infections. All of the patient's questions and concerns were addressed.
Erythromycin Counseling:  I discussed with the patient the risks of erythromycin including but not limited to GI upset, allergic reaction, drug rash, diarrhea, increase in liver enzymes, and yeast infections.
Clofazimine Counseling:  I discussed with the patient the risks of clofazimine including but not limited to skin and eye pigmentation, liver damage, nausea/vomiting, gastrointestinal bleeding and allergy.
Rifampin Pregnancy And Lactation Text: This medication is Pregnancy Category C and it isn't know if it is safe during pregnancy. It is also excreted in breast milk and should not be used if you are breast feeding.
Spironolactone Pregnancy And Lactation Text: This medication can cause feminization of the male fetus and should be avoided during pregnancy. The active metabolite is also found in breast milk.
Ilumya Counseling: I discussed with the patient the risks of tildrakizumab including but not limited to immunosuppression, malignancy, posterior leukoencephalopathy syndrome, and serious infections. The patient understands that monitoring is required including a PPD at baseline and must alert us or the primary physician if symptoms of infection or other concerning signs are noted.
Benzoyl Peroxide Pregnancy And Lactation Text: This medication is Pregnancy Category C. It is unknown if benzoyl peroxide is excreted in breast milk.
Azithromycin Pregnancy And Lactation Text: This medication is considered safe during pregnancy and is also secreted in breast milk.
Topical Steroids Applications Pregnancy And Lactation Text: Most topical steroids are considered safe to use during pregnancy and lactation. Any topical steroid applied to the breast or nipple should be washed off before breastfeeding.
Elidel Counseling: Patient may experience a mild burning sensation during topical application. Elidel is not approved in children less than 3years of age. There have been case reports of hematologic and skin malignancies in patients using topical calcineurin inhibitors although causality is questionable.
Prednisone Counseling:  I discussed with the patient the risks of prolonged use of prednisone including but not limited to weight gain, insomnia, osteoporosis, mood changes, diabetes, susceptibility to infection, glaucoma and high blood pressure. In cases where prednisone use is prolonged, patients should be monitored with blood pressure checks, serum glucose levels and an eye exam.  Additionally, the patient may need to be placed on GI prophylaxis, PCP prophylaxis, and calcium and vitamin D supplementation and/or a bisphosphonate. The patient verbalized understanding of the proper use and the possible adverse effects of prednisone. All of the patient's questions and concerns were addressed.
Cimzia Pregnancy And Lactation Text: This medication crosses the placenta but can be considered safe in certain situations. Cimzia may be excreted in breast milk.
Mirvaso Counseling: Wm Holster is a topical medication which can decrease superficial blood flow where applied. Side effects are uncommon and include stinging, redness and allergic reactions.
Isotretinoin Pregnancy And Lactation Text: This medication is Pregnancy Category X and is considered extremely dangerous during pregnancy. It is unknown if it is excreted in breast milk.
Sotyktu Pregnancy And Lactation Text: There is insufficient data to evaluate whether or not Sotyktu is safe to use during pregnancy. Â  Â It is not known if Sotyktu passes into breast milk and whether or not it is safe to use when breastfeeding. Â Â 
Tazorac Counseling:  Patient advised that medication is irritating and drying. Patient may need to apply sparingly and wash off after an hour before eventually leaving it on overnight. The patient verbalized understanding of the proper use and possible adverse effects of tazorac. All of the patient's questions and concerns were addressed.
Hydroxyzine Pregnancy And Lactation Text: This medication is not safe during pregnancy and should not be taken. It is also excreted in breast milk and breast feeding isn't recommended.
Qbrexza Pregnancy And Lactation Text: There is no available data on Qbrexza use in pregnant women. There is no available data on Qbrexza use in lactation.
Dutasteride Pregnancy And Lactation Text: This medication is absolutely contraindicated in women, especially during pregnancy and breast feeding. Feminization of male fetuses is possible if taking while pregnant.
Valtrex Pregnancy And Lactation Text: this medication is Pregnancy Category B and is considered safe during pregnancy. This medication is not directly found in breast milk but it's metabolite acyclovir is present.
Hydroxychloroquine Counseling:  I discussed with the patient that a baseline ophthalmologic exam is needed at the start of therapy and every year thereafter while on therapy. A CBC may also be warranted for monitoring. The side effects of this medication were discussed with the patient, including but not limited to agranulocytosis, aplastic anemia, seizures, rashes, retinopathy, and liver toxicity. Patient instructed to call the office should any adverse effect occur. The patient verbalized understanding of the proper use and possible adverse effects of Plaquenil. All the patient's questions and concerns were addressed.
Minoxidil Counseling: Minoxidil is a topical medication which can increase blood flow where it is applied. It is uncertain how this medication increases hair growth. Side effects are uncommon and include stinging and allergic reactions.
Xolair Pregnancy And Lactation Text: This medication is Pregnancy Category B and is considered safe during pregnancy. This medication is excreted in breast milk.
Propranolol Counseling:  I discussed with the patient the risks of propranolol including but not limited to low heart rate, low blood pressure, low blood sugar, restlessness and increased cold sensitivity. They should call the office if they experience any of these side effects.
Sarecycline Counseling: Patient advised regarding possible photosensitivity and discoloration of the teeth, skin, lips, tongue and gums. Patient instructed to avoid sunlight, if possible. When exposed to sunlight, patients should wear protective clothing, sunglasses, and sunscreen. The patient was instructed to call the office immediately if the following severe adverse effects occur:  hearing changes, easy bruising/bleeding, severe headache, or vision changes. The patient verbalized understanding of the proper use and possible adverse effects of sarecycline. All of the patient's questions and concerns were addressed.
Ketoconazole Pregnancy And Lactation Text: This medication is Pregnancy Category C and it isn't know if it is safe during pregnancy. It is also excreted in breast milk and breast feeding isn't recommended.
Skyrizi Counseling: I discussed with the patient the risks of risankizumab-rzaa including but not limited to immunosuppression, and serious infections. The patient understands that monitoring is required including a PPD at baseline and must alert us or the primary physician if symptoms of infection or other concerning signs are noted.
Cosentyx Counseling:  I discussed with the patient the risks of Cosentyx including but not limited to worsening of Crohn's disease, immunosuppression, allergic reactions and infections. The patient understands that monitoring is required including a PPD at baseline and must alert us or the primary physician if symptoms of infection or other concerning signs are noted.
Topical Sulfur Applications Counseling: Topical Sulfur Counseling: Patient counseled that this medication may cause skin irritation or allergic reactions. In the event of skin irritation, the patient was advised to reduce the amount of the drug applied or use it less frequently. The patient verbalized understanding of the proper use and possible adverse effects of topical sulfur application. All of the patient's questions and concerns were addressed.
Erythromycin Pregnancy And Lactation Text: This medication is Pregnancy Category B and is considered safe during pregnancy. It is also excreted in breast milk.
Bactrim Counseling:  I discussed with the patient the risks of sulfa antibiotics including but not limited to GI upset, allergic reaction, drug rash, diarrhea, dizziness, photosensitivity, and yeast infections. Rarely, more serious reactions can occur including but not limited to aplastic anemia, agranulocytosis, methemoglobinemia, blood dyscrasias, liver or kidney failure, lung infiltrates or desquamative/blistering drug rashes.
Cellcept Counseling:  I discussed with the patient the risks of mycophenolate mofetil including but not limited to infection/immunosuppression, GI upset, hypokalemia, hypercholesterolemia, bone marrow suppression, lymphoproliferative disorders, malignancy, GI ulceration/bleed/perforation, colitis, interstitial lung disease, kidney failure, progressive multifocal leukoencephalopathy, and birth defects. The patient understands that monitoring is required including a baseline creatinine and regular CBC testing. In addition, patient must alert us immediately if symptoms of infection or other concerning signs are noted.
High Dose Vitamin A Counseling: Side effects reviewed, pt to contact office should one occur.
Tazorac Pregnancy And Lactation Text: This medication is not safe during pregnancy. It is unknown if this medication is excreted in breast milk.
Carac Counseling:  I discussed with the patient the risks of Carac including but not limited to erythema, scaling, itching, weeping, crusting, and pain.
Azathioprine Counseling:  I discussed with the patient the risks of azathioprine including but not limited to myelosuppression, immunosuppression, hepatotoxicity, lymphoma, and infections. The patient understands that monitoring is required including baseline LFTs, Creatinine, possible TPMP genotyping and weekly CBCs for the first month and then every 2 weeks thereafter. The patient verbalized understanding of the proper use and possible adverse effects of azathioprine. All of the patient's questions and concerns were addressed.
Rhofade Counseling: Rhofade is a topical medication which can decrease superficial blood flow where applied. Side effects are uncommon and include stinging, redness and allergic reactions.
Hydroxychloroquine Pregnancy And Lactation Text: This medication has been shown to cause fetal harm but it isn't assigned a Pregnancy Risk Category. There are small amounts excreted in breast milk.
Xeljanz Counseling: Ricardo Abide Counseling: I discussed with the patient the risks of Lauree Abide therapy including increased risk of infection, liver issues, headache, diarrhea, or cold symptoms. Live vaccines should be avoided. They were instructed to call if they have any problems.
Albendazole Counseling:  I discussed with the patient the risks of albendazole including but not limited to cytopenia, kidney damage, nausea/vomiting and severe allergy. The patient understands that this medication is being used in an off-label manner.
Propranolol Pregnancy And Lactation Text: This medication is Pregnancy Category C and it isn't known if it is safe during pregnancy. It is excreted in breast milk.
Eucrisa Counseling: Patient may experience a mild burning sensation during topical application. Priscilla Coast is not approved in children less than 1months of age.
Metronidazole Counseling:  I discussed with the patient the risks of metronidazole including but not limited to seizures, nausea/vomiting, a metallic taste in the mouth, nausea/vomiting and severe allergy.
Colchicine Counseling:  Patient counseled regarding adverse effects including but not limited to stomach upset (nausea, vomiting, stomach pain, or diarrhea). Patient instructed to limit alcohol consumption while taking this medication. Colchicine may reduce blood counts especially with prolonged use. The patient understands that monitoring of kidney function and blood counts may be required, especially at baseline. The patient verbalized understanding of the proper use and possible adverse effects of colchicine. All of the patient's questions and concerns were addressed.
Terbinafine Counseling: Patient counseling regarding adverse effects of terbinafine including but not limited to headache, diarrhea, rash, upset stomach, liver function test abnormalities, itching, taste/smell disturbance, nausea, abdominal pain, and flatulence. There is a rare possibility of liver failure that can occur when taking terbinafine. The patient understands that a baseline LFT and kidney function test may be required. The patient verbalized understanding of the proper use and possible adverse effects of terbinafine. All of the patient's questions and concerns were addressed.
High Dose Vitamin A Pregnancy And Lactation Text: High dose vitamin A therapy is contraindicated during pregnancy and breast feeding.
Zyclara Counseling:  I discussed with the patient the risks of imiquimod including but not limited to erythema, scaling, itching, weeping, crusting, and pain. Patient understands that the inflammatory response to imiquimod is variable from person to person and was educated regarded proper titration schedule. If flu-like symptoms develop, patient knows to discontinue the medication and contact us.
Olanzapine Counseling- I discussed with the patient the common side effects of olanzapine including but are not limited to: lack of energy, dry mouth, increased appetite, sleepiness, tremor, constipation, dizziness, changes in behavior, or restlessness. Explained that teenagers are more likely to experience headaches, abdominal pain, pain in the arms or legs, tiredness, and sleepiness. Serious side effects include but are not limited: increased risk of death in elderly patients who are confused, have memory loss, or dementia-related psychosis; hyperglycemia; increased cholesterol and triglycerides; and weight gain.
Infliximab Counseling:  I discussed with the patient the risks of infliximab including but not limited to myelosuppression, immunosuppression, autoimmune hepatitis, demyelinating diseases, lymphoma, and serious infections. The patient understands that monitoring is required including a PPD at baseline and must alert us or the primary physician if symptoms of infection or other concerning signs are noted.
Erivedge Counseling- I discussed with the patient the risks of Erivedge including but not limited to nausea, vomiting, diarrhea, constipation, weight loss, changes in the sense of taste, decreased appetite, muscle spasms, and hair loss. The patient verbalized understanding of the proper use and possible adverse effects of Erivedge. All of the patient's questions and concerns were addressed.
Cellcept Pregnancy And Lactation Text: This medication is Pregnancy Category D and isn't considered safe during pregnancy. It is unknown if this medication is excreted in breast milk.
Topical Clindamycin Counseling: Patient counseled that this medication may cause skin irritation or allergic reactions. In the event of skin irritation, the patient was advised to reduce the amount of the drug applied or use it less frequently. The patient verbalized understanding of the proper use and possible adverse effects of clindamycin. All of the patient's questions and concerns were addressed.
Topical Sulfur Applications Pregnancy And Lactation Text: This medication is considered safe during pregnancy and breast feeding secondary to limited systemic absorption.
Bactrim Pregnancy And Lactation Text: This medication is Pregnancy Category D and is known to cause fetal risk. It is also excreted in breast milk.
Cibinqo Counseling: I discussed with the patient the risks of Cibinqo therapy including but not limited to common cold, nausea, headache, cold sores, increased blood CPK levels, dizziness, UTIs, fatigue, acne, and vomitting. Live vaccines should be avoided. This medication has been linked to serious infections; higher rate of mortality; malignancy and lymphoproliferative disorders; major adverse cardiovascular events; thrombosis; thrombocytopenia and lymphopenia; lipid elevations; and retinal detachment.
Albendazole Pregnancy And Lactation Text: This medication is Pregnancy Category C and it isn't known if it is safe during pregnancy. It is also excreted in breast milk.
SSKI Counseling:  I discussed with the patient the risks of SSKI including but not limited to thyroid abnormalities, metallic taste, GI upset, fever, headache, acne, arthralgias, paraesthesias, lymphadenopathy, easy bleeding, arrhythmias, and allergic reaction.
Xelmukeshz Pregnancy And Lactation Text: This medication is Pregnancy Category D and is not considered safe during pregnancy. The risk during breast feeding is also uncertain.
Tetracycline Counseling: Patient counseled regarding possible photosensitivity and increased risk for sunburn. Patient instructed to avoid sunlight, if possible. When exposed to sunlight, patients should wear protective clothing, sunglasses, and sunscreen. The patient was instructed to call the office immediately if the following severe adverse effects occur:  hearing changes, easy bruising/bleeding, severe headache, or vision changes. The patient verbalized understanding of the proper use and possible adverse effects of tetracycline. All of the patient's questions and concerns were addressed. Patient understands to avoid pregnancy while on therapy due to potential birth defects.
Metronidazole Pregnancy And Lactation Text: This medication is Pregnancy Category B and considered safe during pregnancy. It is also excreted in breast milk.
Stelara Counseling:  I discussed with the patient the risks of ustekinumab including but not limited to immunosuppression, malignancy, posterior leukoencephalopathy syndrome, and serious infections. The patient understands that monitoring is required including a PPD at baseline and must alert us or the primary physician if symptoms of infection or other concerning signs are noted.
Low Dose Naltrexone Counseling- I discussed with the patient the potential risks and side effects of low dose naltrexone including but not limited to: more vivid dreams, headaches, nausea, vomiting, abdominal pain, fatigue, dizziness, and anxiety.
Olanzapine Pregnancy And Lactation Text: This medication is pregnancy category C. There are no adequate and well controlled trials with olanzapine in pregnant females. Olanzapine should be used during pregnancy only if the potential benefit justifies the potential risk to the fetus. In a study in lactating healthy women, olanzapine was excreted in breast milk. It is recommended that women taking olanzapine should not breast feed.
Calcipotriene Counseling:  I discussed with the patient the risks of calcipotriene including but not limited to erythema, scaling, itching, and irritation.
Cephalexin Counseling: I counseled the patient regarding use of cephalexin as an antibiotic for prophylactic and/or therapeutic purposes. Cephalexin (commonly prescribed under brand name Keflex) is a cephalosporin antibiotic which is active against numerous classes of bacteria, including most skin bacteria. Side effects may include nausea, diarrhea, gastrointestinal upset, rash, hives, yeast infections, and in rare cases, hepatitis, kidney disease, seizures, fever, confusion, neurologic symptoms, and others. Patients with severe allergies to penicillin medications are cautioned that there is about a 10% incidence of cross-reactivity with cephalosporins. When possible, patients with penicillin allergies should use alternatives to cephalosporins for antibiotic therapy.
Cyclophosphamide Counseling:  I discussed with the patient the risks of cyclophosphamide including but not limited to hair loss, hormonal abnormalities, decreased fertility, abdominal pain, diarrhea, nausea and vomiting, bone marrow suppression and infection. The patient understands that monitoring is required while taking this medication.
Wartpeel Counseling:  I discussed with the patient the risks of Wartpeel including but not limited to erythema, scaling, itching, weeping, crusting, and pain.
Dupixent Counseling: I discussed with the patient the risks of dupilumab including but not limited to eye infection and irritation, cold sores, injection site reactions, worsening of asthma, allergic reactions and increased risk of parasitic infection. Live vaccines should be avoided while taking dupilumab. Dupilumab will also interact with certain medications such as warfarin and cyclosporine. The patient understands that monitoring is required and they must alert us or the primary physician if symptoms of infection or other concerning signs are noted.
Cibinqo Pregnancy And Lactation Text: It is unknown if this medication will adversely affect pregnancy or breast feeding. You should not take this medication if you are currently pregnant or planning a pregnancy or while breastfeeding.
Calcipotriene Pregnancy And Lactation Text: The use of this medication during pregnancy or lactation is not recommended as there is insufficient data.
Opzelura Pregnancy And Lactation Text: There is insufficient data to evaluate drug-associated risk for major birth defects, miscarriage, or other adverse maternal or fetal outcomes. There is a pregnancy registry that monitors pregnancy outcomes in pregnant persons exposed to the medication during pregnancy. It is unknown if this medication is excreted in breast milk. Do not breastfeed during treatment and for about 4 weeks after the last dose.
Solaraze Counseling:  I discussed with the patient the risks of Solaraze including but not limited to erythema, scaling, itching, weeping, crusting, and pain.
Low Dose Naltrexone Pregnancy And Lactation Text: Naltrexone is pregnancy category C. There have been no adequate and well-controlled studies in pregnant women. It should be used in pregnancy only if the potential benefit justifies the potential risk to the fetus. Limited data indicates that naltrexone is minimally excreted into breastmilk.
Ivermectin Counseling:  Patient instructed to take medication on an empty stomach with a full glass of water. Patient informed of potential adverse effects including but not limited to nausea, diarrhea, dizziness, itching, and swelling of the extremities or lymph nodes. The patient verbalized understanding of the proper use and possible adverse effects of ivermectin. All of the patient's questions and concerns were addressed.
Griseofulvin Pregnancy And Lactation Text: This medication is Pregnancy Category X and is known to cause serious birth defects. It is unknown if this medication is excreted in breast milk but breast feeding should be avoided.
Oral Minoxidil Counseling- I discussed with the patient the risks of oral minoxidil including but not limited to shortness of breath, swelling of the feet or ankles, dizziness, lightheadedness, unwanted hair growth and allergic reaction. The patient verbalized understanding of the proper use and possible adverse effects of oral minoxidil. All of the patient's questions and concerns were addressed.
Minocycline Counseling: Patient advised regarding possible photosensitivity and discoloration of the teeth, skin, lips, tongue and gums. Patient instructed to avoid sunlight, if possible. When exposed to sunlight, patients should wear protective clothing, sunglasses, and sunscreen. The patient was instructed to call the office immediately if the following severe adverse effects occur:  hearing changes, easy bruising/bleeding, severe headache, or vision changes. The patient verbalized understanding of the proper use and possible adverse effects of minocycline. All of the patient's questions and concerns were addressed.
Rituxan Counseling:  I discussed with the patient the risks of Rituxan infusions. Side effects can include infusion reactions, severe drug rashes including mucocutaneous reactions, reactivation of latent hepatitis and other infections and rarely progressive multifocal leukoencephalopathy. All of the patient's questions and concerns were addressed.
Sski Pregnancy And Lactation Text: This medication is Pregnancy Category D and isn't considered safe during pregnancy. It is excreted in breast milk.
Cyclophosphamide Pregnancy And Lactation Text: This medication is Pregnancy Category D and it isn't considered safe during pregnancy. This medication is excreted in breast milk.
Hydroquinone Counseling:  Patient advised that medication may result in skin irritation, lightening (hypopigmentation), dryness, and burning. In the event of skin irritation, the patient was advised to reduce the amount of the drug applied or use it less frequently. Rarely, spots that are treated with hydroquinone can become darker (pseudoochronosis). Should this occur, patient instructed to stop medication and call the office. The patient verbalized understanding of the proper use and possible adverse effects of hydroquinone. All of the patient's questions and concerns were addressed.
Dapsone Counseling: I discussed with the patient the risks of dapsone including but not limited to hemolytic anemia, agranulocytosis, rashes, methemoglobinemia, kidney failure, peripheral neuropathy, headaches, GI upset, and liver toxicity. Patients who start dapsone require monitoring including baseline LFTs and weekly CBCs for the first month, then every month thereafter. The patient verbalized understanding of the proper use and possible adverse effects of dapsone. All of the patient's questions and concerns were addressed.
Cantharidin Counseling:  I discussed with the patient the risks of Cantharidin including but not limited to pain, redness, burning, itching, and blistering.
Cephalexin Pregnancy And Lactation Text: This medication is Pregnancy Category B and considered safe during pregnancy. It is also excreted in breast milk but can be used safely for shorter doses.
Dupixent Pregnancy And Lactation Text: This medication likely crosses the placenta but the risk for the fetus is uncertain. This medication is excreted in breast milk.
Detail Level: Zone
Finasteride Counseling:  I discussed with the patient the risks of use of finasteride including but not limited to decreased libido, decreased ejaculate volume, gynecomastia, and depression. Women should not handle medication. All of the patient's questions and concerns were addressed.
Aklief counseling:  Patient advised to apply a pea-sized amount only at bedtime and wait 30 minutes after washing their face before applying. If too drying, patient may add a non-comedogenic moisturizer. The most commonly reported side effects including irritation, redness, scaling, dryness, stinging, burning, itching, and increased risk of sunburn. The patient verbalized understanding of the proper use and possible adverse effects of retinoids. All of the patient's questions and concerns were addressed.
Libtayo Counseling- I discussed with the patient the risks of Libtayo including but not limited to nausea, vomiting, diarrhea, and bone or muscle pain. The patient verbalized understanding of the proper use and possible adverse effects of Libtayo. All of the patient's questions and concerns were addressed.
Topical Ketoconazole Counseling: Patient counseled that this medication may cause skin irritation or allergic reactions. In the event of skin irritation, the patient was advised to reduce the amount of the drug applied or use it less frequently. The patient verbalized understanding of the proper use and possible adverse effects of ketoconazole. All of the patient's questions and concerns were addressed.
Acitretin Counseling:  I discussed with the patient the risks of acitretin including but not limited to hair loss, dry lips/skin/eyes, liver damage, hyperlipidemia, depression/suicidal ideation, photosensitivity. Serious rare side effects can include but are not limited to pancreatitis, pseudotumor cerebri, bony changes, clot formation/stroke/heart attack. Patient understands that alcohol is contraindicated since it can result in liver toxicity and significantly prolong the elimination of the drug by many years.
Solaraze Pregnancy And Lactation Text: This medication is Pregnancy Category B and is considered safe. There is some data to suggest avoiding during the third trimester. It is unknown if this medication is excreted in breast milk.
Olumiant Counseling: I discussed with the patient the risks of Olumiant therapy including but not limited to upper respiratory tract infections, shingles, cold sores, and nausea. Live vaccines should be avoided. This medication has been linked to serious infections; higher rate of mortality; malignancy and lymphoproliferative disorders; major adverse cardiovascular events; thrombosis; gastrointestinal perforations; neutropenia; lymphopenia; anemia; liver enzyme elevations; and lipid elevations.
Cimetidine Counseling:  I discussed with the patient the risks of Cimetidine including but not limited to gynecomastia, headache, diarrhea, nausea, drowsiness, arrhythmias, pancreatitis, skin rashes, psychosis, bone marrow suppression and kidney toxicity.
Opioid Counseling: I discussed with the patient the potential side effects of opioids including but not limited to addiction, altered mental status, and depression. I stressed avoiding alcohol, benzodiazepines, muscle relaxants and sleep aids unless specifically okayed by a physician. The patient verbalized understanding of the proper use and possible adverse effects of opioids. All of the patient's questions and concerns were addressed. They were instructed to flush the remaining pills down the toilet if they did not need them for pain.
Picato Counseling:  I discussed with the patient the risks of Picato including but not limited to erythema, scaling, itching, weeping, crusting, and pain.
Niacinamide Counseling: I recommended taking niacin or niacinamide, also know as vitamin B3, twice daily. Recent evidence suggests that taking vitamin B3 (500 mg twice daily) can reduce the risk of actinic keratoses and non-melanoma skin cancers. Side effects of vitamin B3 include flushing and headache.
Cantharidin Pregnancy And Lactation Text: This medication has not been proven safe during pregnancy. It is unknown if this medication is excreted in breast milk.
Thalidomide Counseling: I discussed with the patient the risks of thalidomide including but not limited to birth defects, anxiety, weakness, chest pain, dizziness, cough and severe allergy.
Taltz Counseling: I discussed with the patient the risks of ixekizumab including but not limited to immunosuppression, serious infections, worsening of inflammatory bowel disease and drug reactions. The patient understands that monitoring is required including a PPD at baseline and must alert us or the primary physician if symptoms of infection or other concerning signs are noted.
Rituxan Pregnancy And Lactation Text: This medication is Pregnancy Category C and it isn't know if it is safe during pregnancy. It is unknown if this medication is excreted in breast milk but similar antibodies are known to be excreted.
Dapsone Pregnancy And Lactation Text: This medication is Pregnancy Category C and is not considered safe during pregnancy or breast feeding.
Methotrexate Counseling:  Patient counseled regarding adverse effects of methotrexate including but not limited to nausea, vomiting, abnormalities in liver function tests. Patients may develop mouth sores, rash, diarrhea, and abnormalities in blood counts. The patient understands that monitoring is required including LFT's and blood counts.  There is a rare possibility of scarring of the liver and lung problems that can occur when taking methotrexate. Persistent nausea, loss of appetite, pale stools, dark urine, cough, and shortness of breath should be reported immediately. Patient advised to discontinue methotrexate treatment at least three months before attempting to become pregnant.  I discussed the need for folate supplements while taking methotrexate.  These supplements can decrease side effects during methotrexate treatment. The patient verbalized understanding of the proper use and possible adverse effects of methotrexate.  All of the patient's questions and concerns were addressed.
Klisyri Counseling:  I discussed with the patient the risks of Odessia Best including but not limited to erythema, scaling, itching, weeping, crusting, and pain.
Topical Ketoconazole Counseling: Patient counseled that this medication may cause skin irritation or allergic reactions.  In the event of skin irritation, the patient was advised to reduce the amount of the drug applied or use it less frequently.   The patient verbalized understanding of the proper use and possible adverse effects of ketoconazole.  All of the patient's questions and concerns were addressed.
Hydroxychloroquine Counseling:  I discussed with the patient that a baseline ophthalmologic exam is needed at the start of therapy and every year thereafter while on therapy. A CBC may also be warranted for monitoring.  The side effects of this medication were discussed with the patient, including but not limited to agranulocytosis, aplastic anemia, seizures, rashes, retinopathy, and liver toxicity. Patient instructed to call the office should any adverse effect occur.  The patient verbalized understanding of the proper use and possible adverse effects of Plaquenil.  All the patient's questions and concerns were addressed.
Rinvoq Counseling: I discussed with the patient the risks of Rinvoq therapy including but not limited to upper respiratory tract infections, shingles, cold sores, bronchitis, nausea, cough, fever, acne, and headache. Live vaccines should be avoided.  This medication has been linked to serious infections; higher rate of mortality; malignancy and lymphoproliferative disorders; major adverse cardiovascular events; thrombosis; thrombocytopenia, anemia, and neutropenia; lipid elevations; liver enzyme elevations; and gastrointestinal perforations.
Infliximab Counseling:  I discussed with the patient the risks of infliximab including but not limited to myelosuppression, immunosuppression, autoimmune hepatitis, demyelinating diseases, lymphoma, and serious infections.  The patient understands that monitoring is required including a PPD at baseline and must alert us or the primary physician if symptoms of infection or other concerning signs are noted.
Soolantra Counseling: I discussed with the patients the risks of topial Soolantra. This is a medicine which decreases the number of mites and inflammation in the skin. You experience burning, stinging, eye irritation or allergic reactions.  Please call our office if you develop any problems from using this medication.
Olanzapine Pregnancy And Lactation Text: This medication is pregnancy category C.   There are no adequate and well controlled trials with olanzapine in pregnant females.  Olanzapine should be used during pregnancy only if the potential benefit justifies the potential risk to the fetus.   In a study in lactating healthy women, olanzapine was excreted in breast milk.  It is recommended that women taking olanzapine should not breast feed.
Doxycycline Counseling:  Patient counseled regarding possible photosensitivity and increased risk for sunburn.  Patient instructed to avoid sunlight, if possible.  When exposed to sunlight, patients should wear protective clothing, sunglasses, and sunscreen.  The patient was instructed to call the office immediately if the following severe adverse effects occur:  hearing changes, easy bruising/bleeding, severe headache, or vision changes.  The patient verbalized understanding of the proper use and possible adverse effects of doxycycline.  All of the patient's questions and concerns were addressed.
Xolair Counseling:  Patient informed of potential adverse effects including but not limited to fever, muscle aches, rash and allergic reactions.  The patient verbalized understanding of the proper use and possible adverse effects of Xolair.  All of the patient's questions and concerns were addressed.
Dupixent Counseling: I discussed with the patient the risks of dupilumab including but not limited to eye infection and irritation, cold sores, injection site reactions, worsening of asthma, allergic reactions and increased risk of parasitic infection.  Live vaccines should be avoided while taking dupilumab. Dupilumab will also interact with certain medications such as warfarin and cyclosporine. The patient understands that monitoring is required and they must alert us or the primary physician if symptoms of infection or other concerning signs are noted.
Dutasteride Male Counseling: Dustasteride Counseling:  I discussed with the patient the risks of use of dutasteride including but not limited to decreased libido, decreased ejaculate volume, and gynecomastia. Women who can become pregnant should not handle medication.  All of the patient's questions and concerns were addressed.
Ivermectin Counseling:  Patient instructed to take medication on an empty stomach with a full glass of water.  Patient informed of potential adverse effects including but not limited to nausea, diarrhea, dizziness, itching, and swelling of the extremities or lymph nodes.  The patient verbalized understanding of the proper use and possible adverse effects of ivermectin.  All of the patient's questions and concerns were addressed.
Elidel Counseling: Patient may experience a mild burning sensation during topical application. Elidel is not approved in children less than 2 years of age. There have been case reports of hematologic and skin malignancies in patients using topical calcineurin inhibitors although causality is questionable.
Azathioprine Counseling:  I discussed with the patient the risks of azathioprine including but not limited to myelosuppression, immunosuppression, hepatotoxicity, lymphoma, and infections.  The patient understands that monitoring is required including baseline LFTs, Creatinine, possible TPMP genotyping and weekly CBCs for the first month and then every 2 weeks thereafter.  The patient verbalized understanding of the proper use and possible adverse effects of azathioprine.  All of the patient's questions and concerns were addressed.
Colchicine Counseling:  Patient counseled regarding adverse effects including but not limited to stomach upset (nausea, vomiting, stomach pain, or diarrhea).  Patient instructed to limit alcohol consumption while taking this medication.  Colchicine may reduce blood counts especially with prolonged use.  The patient understands that monitoring of kidney function and blood counts may be required, especially at baseline. The patient verbalized understanding of the proper use and possible adverse effects of colchicine.  All of the patient's questions and concerns were addressed.
Griseofulvin Counseling:  I discussed with the patient the risks of griseofulvin including but not limited to photosensitivity, cytopenia, liver damage, nausea/vomiting and severe allergy.  The patient understands that this medication is best absorbed when taken with a fatty meal (e.g., ice cream or french fries).
Protopic Counseling: Patient may experience a mild burning sensation during topical application. Protopic is not approved in children less than 2 years of age. There have been case reports of hematologic and skin malignancies in patients using topical calcineurin inhibitors although causality is questionable.
Rinvoq Pregnancy And Lactation Text: Based on animal studies, Rinvoq may cause embryo-fetal harm when administered to pregnant women.  The medication should not be used in pregnancy.  Breastfeeding is not recommended during treatment and for 6 days after the last dose.
Skyrizi Counseling: I discussed with the patient the risks of risankizumab-rzaa including but not limited to immunosuppression, and serious infections.  The patient understands that monitoring is required including a PPD at baseline and must alert us or the primary physician if symptoms of infection or other concerning signs are noted.
Sotyktu Counseling:  I discussed the most common side effects of Sotyktu including: common cold, sore throat, sinus infections, cold sores, canker sores, folliculitis, and acne.  I also discussed more serious side effects of Sotyktu including but not limited to: serious allergic reactions; increased risk for infections such as TB; cancers such as lymphomas; rhabdomyolysis and elevated CPK; and elevated triglycerides and liver enzymes. 
Oral Minoxidil Counseling- I discussed with the patient the risks of oral minoxidil including but not limited to shortness of breath, swelling of the feet or ankles, dizziness, lightheadedness, unwanted hair growth and allergic reaction.  The patient verbalized understanding of the proper use and possible adverse effects of oral minoxidil.  All of the patient's questions and concerns were addressed.
Topical Metronidazole Counseling: Metronidazole is a topical antibiotic medication. You may experience burning, stinging, redness, or allergic reactions.  Please call our office if you develop any problems from using this medication.
Rituxan Counseling:  I discussed with the patient the risks of Rituxan infusions. Side effects can include infusion reactions, severe drug rashes including mucocutaneous reactions, reactivation of latent hepatitis and other infections and rarely progressive multifocal leukoencephalopathy.  All of the patient's questions and concerns were addressed.
Prednisone Counseling:  I discussed with the patient the risks of prolonged use of prednisone including but not limited to weight gain, insomnia, osteoporosis, mood changes, diabetes, susceptibility to infection, glaucoma and high blood pressure.  In cases where prednisone use is prolonged, patients should be monitored with blood pressure checks, serum glucose levels and an eye exam.  Additionally, the patient may need to be placed on GI prophylaxis, PCP prophylaxis, and calcium and vitamin D supplementation and/or a bisphosphonate.  The patient verbalized understanding of the proper use and the possible adverse effects of prednisone.  All of the patient's questions and concerns were addressed.
Sotyktu Pregnancy And Lactation Text: There is insufficient data to evaluate whether or not Sotyktu is safe to use during pregnancy.   It is not known if Sotyktu passes into breast milk and whether or not it is safe to use when breastfeeding.  
Eucrisa Counseling: Patient may experience a mild burning sensation during topical application. Genice Day is not approved in children less than 1months of age.
Topical Retinoid counseling:  Patient advised to apply a pea-sized amount only at bedtime and wait 30 minutes after washing their face before applying.  If too drying, patient may add a non-comedogenic moisturizer. The patient verbalized understanding of the proper use and possible adverse effects of retinoids.  All of the patient's questions and concerns were addressed.
Cellcept Counseling:  I discussed with the patient the risks of mycophenolate mofetil including but not limited to infection/immunosuppression, GI upset, hypokalemia, hypercholesterolemia, bone marrow suppression, lymphoproliferative disorders, malignancy, GI ulceration/bleed/perforation, colitis, interstitial lung disease, kidney failure, progressive multifocal leukoencephalopathy, and birth defects.  The patient understands that monitoring is required including a baseline creatinine and regular CBC testing. In addition, patient must alert us immediately if symptoms of infection or other concerning signs are noted.
Dapsone Counseling: I discussed with the patient the risks of dapsone including but not limited to hemolytic anemia, agranulocytosis, rashes, methemoglobinemia, kidney failure, peripheral neuropathy, headaches, GI upset, and liver toxicity.  Patients who start dapsone require monitoring including baseline LFTs and weekly CBCs for the first month, then every month thereafter.  The patient verbalized understanding of the proper use and possible adverse effects of dapsone.  All of the patient's questions and concerns were addressed.
Enbrel Counseling:  I discussed with the patient the risks of etanercept including but not limited to myelosuppression, immunosuppression, autoimmune hepatitis, demyelinating diseases, lymphoma, and infections.  The patient understands that monitoring is required including a PPD at baseline and must alert us or the primary physician if symptoms of infection or other concerning signs are noted.
Topical Metronidazole Pregnancy And Lactation Text: This medication is Pregnancy Category B and considered safe during pregnancy.  It is also considered safe to use while breastfeeding.
Low Dose Naltrexone Pregnancy And Lactation Text: Naltrexone is pregnancy category C.  There have been no adequate and well-controlled studies in pregnant women.  It should be used in pregnancy only if the potential benefit justifies the potential risk to the fetus.   Limited data indicates that naltrexone is minimally excreted into breastmilk.
Doxepin Counseling:  Patient advised that the medication is sedating and not to drive a car after taking this medication. Patient informed of potential adverse effects including but not limited to dry mouth, urinary retention, and blurry vision.  The patient verbalized understanding of the proper use and possible adverse effects of doxepin.  All of the patient's questions and concerns were addressed.
Finasteride Male Counseling: Finasteride Counseling:  I discussed with the patient the risks of use of finasteride including but not limited to decreased libido, decreased ejaculate volume, gynecomastia, and depression. Women should not handle medication.  All of the patient's questions and concerns were addressed.
Bactrim Counseling:  I discussed with the patient the risks of sulfa antibiotics including but not limited to GI upset, allergic reaction, drug rash, diarrhea, dizziness, photosensitivity, and yeast infections.  Rarely, more serious reactions can occur including but not limited to aplastic anemia, agranulocytosis, methemoglobinemia, blood dyscrasias, liver or kidney failure, lung infiltrates or desquamative/blistering drug rashes.
VTAMA Counseling: I discussed with the patient that Everet Sinks is not for use in the eyes, mouth or mouth. They should call the office if they develop any signs of allergic reactions to Everet Sinks. The patient verbalized understanding of the proper use and possible adverse effects of VTAMA. All of the patient's questions and concerns were addressed.
Sarecycline Counseling: Patient advised regarding possible photosensitivity and discoloration of the teeth, skin, lips, tongue and gums.  Patient instructed to avoid sunlight, if possible.  When exposed to sunlight, patients should wear protective clothing, sunglasses, and sunscreen.  The patient was instructed to call the office immediately if the following severe adverse effects occur:  hearing changes, easy bruising/bleeding, severe headache, or vision changes.  The patient verbalized understanding of the proper use and possible adverse effects of sarecycline.  All of the patient's questions and concerns were addressed.
Stelara Counseling:  I discussed with the patient the risks of ustekinumab including but not limited to immunosuppression, malignancy, posterior leukoencephalopathy syndrome, and serious infections.  The patient understands that monitoring is required including a PPD at baseline and must alert us or the primary physician if symptoms of infection or other concerning signs are noted.
Otezla Counseling: Mark Kyle Counseling: The side effects of Mark Kyle were discussed with the patient, including but not limited to worsening or new depression, weight loss, diarrhea, nausea, upper respiratory tract infection, and headache. Patient instructed to call the office should any adverse effect occur. The patient verbalized understanding of the proper use and possible adverse effects of Otezla. All the patient's questions and concerns were addressed.
Qbrexza Counseling:  I discussed with the patient the risks of Kaylene Brood including but not limited to headache, mydriasis, blurred vision, dry eyes, nasal dryness, dry mouth, dry throat, dry skin, urinary hesitation, and constipation. Local skin reactions including erythema, burning, stinging, and itching can also occur.
Adbry Counseling: I discussed with the patient the risks of tralokinumab including but not limited to eye infection and irritation, cold sores, injection site reactions, worsening of asthma, allergic reactions and increased risk of parasitic infection.  Live vaccines should be avoided while taking tralokinumab. The patient understands that monitoring is required and they must alert us or the primary physician if symptoms of infection or other concerning signs are noted.
Itraconazole Counseling:  I discussed with the patient the risks of itraconazole including but not limited to liver damage, nausea/vomiting, neuropathy, and severe allergy.  The patient understands that this medication is best absorbed when taken with acidic beverages such as non-diet cola or ginger ale.  The patient understands that monitoring is required including baseline LFTs and repeat LFTs at intervals.  The patient understands that they are to contact us or the primary physician if concerning signs are noted.
Erivedge Counseling- I discussed with the patient the risks of Erivedge including but not limited to nausea, vomiting, diarrhea, constipation, weight loss, changes in the sense of taste, decreased appetite, muscle spasms, and hair loss.  The patient verbalized understanding of the proper use and possible adverse effects of Erivedge.  All of the patient's questions and concerns were addressed.
Xeljanz Counseling: Elva Nicolas Counseling: I discussed with the patient the risks of Elva Nicolas therapy including increased risk of infection, liver issues, headache, diarrhea, or cold symptoms. Live vaccines should be avoided. They were instructed to call if they have any problems.
Mirvaso Counseling: Terence Ocean is a topical medication which can decrease superficial blood flow where applied. Side effects are uncommon and include stinging, redness and allergic reactions.
Topical Steroids Counseling: I discussed with the patient that prolonged use of topical steroids can result in the increased appearance of superficial blood vessels (telangiectasias), lightening (hypopigmentation) and thinning of the skin (atrophy).  Patient understands to avoid using high potency steroids in skin folds, the groin or the face.  The patient verbalized understanding of the proper use and possible adverse effects of topical steroids.  All of the patient's questions and concerns were addressed.
Qbrexza Pregnancy And Lactation Text: There is no available data on Qbrexza use in pregnant women.  There is no available data on Qbrexza use in lactation.
Cibinqo Counseling: I discussed with the patient the risks of Cibinqo therapy including but not limited to common cold, nausea, headache, cold sores, increased blood CPK levels, dizziness, UTIs, fatigue, acne, and vomitting. Live vaccines should be avoided.  This medication has been linked to serious infections; higher rate of mortality; malignancy and lymphoproliferative disorders; major adverse cardiovascular events; thrombosis; thrombocytopenia and lymphopenia; lipid elevations; and retinal detachment.
Bactrim Pregnancy And Lactation Text: This medication is Pregnancy Category D and is known to cause fetal risk.  It is also excreted in breast milk.
Tazorac Counseling:  Patient advised that medication is irritating and drying.  Patient may need to apply sparingly and wash off after an hour before eventually leaving it on overnight.  The patient verbalized understanding of the proper use and possible adverse effects of tazorac.  All of the patient's questions and concerns were addressed.
Topical Steroids Applications Pregnancy And Lactation Text: Most topical steroids are considered safe to use during pregnancy and lactation.  Any topical steroid applied to the breast or nipple should be washed off before breastfeeding.
Humira Counseling:  I discussed with the patient the risks of adalimumab including but not limited to myelosuppression, immunosuppression, autoimmune hepatitis, demyelinating diseases, lymphoma, and serious infections.  The patient understands that monitoring is required including a PPD at baseline and must alert us or the primary physician if symptoms of infection or other concerning signs are noted.
Hydroquinone Counseling:  Patient advised that medication may result in skin irritation, lightening (hypopigmentation), dryness, and burning.  In the event of skin irritation, the patient was advised to reduce the amount of the drug applied or use it less frequently.  Rarely, spots that are treated with hydroquinone can become darker (pseudoochronosis).  Should this occur, patient instructed to stop medication and call the office. The patient verbalized understanding of the proper use and possible adverse effects of hydroquinone.  All of the patient's questions and concerns were addressed.
Hydroxyzine Counseling: Patient advised that the medication is sedating and not to drive a car after taking this medication.  Patient informed of potential adverse effects including but not limited to dry mouth, urinary retention, and blurry vision.  The patient verbalized understanding of the proper use and possible adverse effects of hydroxyzine.  All of the patient's questions and concerns were addressed.
Birth Control Pills Counseling: Birth Control Pill Counseling: I discussed with the patient the potential side effects of OCPs including but not limited to increased risk of stroke, heart attack, thrombophlebitis, deep venous thrombosis, hepatic adenomas, breast changes, GI upset, headaches, and depression.  The patient verbalized understanding of the proper use and possible adverse effects of OCPs. All of the patient's questions and concerns were addressed.
Zoryve Counseling:  I discussed with the patient that Radene Sleek is not for use in the eyes, mouth or vagina. The most commonly reported side effects include diarrhea, headache, insomnia, application site pain, upper respiratory tract infections, and urinary tract infections. All of the patient's questions and concerns were addressed.
Xelmukeshz Pregnancy And Lactation Text: This medication is Pregnancy Category D and is not considered safe during pregnancy.  The risk during breast feeding is also uncertain.
Cimzia Counseling:  I discussed with the patient the risks of Cimzia including but not limited to immunosuppression, allergic reactions and infections.  The patient understands that monitoring is required including a PPD at baseline and must alert us or the primary physician if symptoms of infection or other concerning signs are noted.
Ketoconazole Counseling:   Patient counseled regarding improving absorption with orange juice.  Adverse effects include but are not limited to breast enlargement, headache, diarrhea, nausea, upset stomach, liver function test abnormalities, taste disturbance, and stomach pain.  There is a rare possibility of liver failure that can occur when taking ketoconazole. The patient understands that monitoring of LFTs may be required, especially at baseline. The patient verbalized understanding of the proper use and possible adverse effects of ketoconazole.  All of the patient's questions and concerns were addressed.
Libtayo Counseling- I discussed with the patient the risks of Libtayo including but not limited to nausea, vomiting, diarrhea, and bone or muscle pain.  The patient verbalized understanding of the proper use and possible adverse effects of Libtayo.  All of the patient's questions and concerns were addressed.
Cibinqo Pregnancy And Lactation Text: It is unknown if this medication will adversely affect pregnancy or breast feeding.  You should not take this medication if you are currently pregnant or planning a pregnancy or while breastfeeding.
Metronidazole Pregnancy And Lactation Text: This medication is Pregnancy Category B and considered safe during pregnancy.  It is also excreted in breast milk.
Tetracycline Counseling: Patient counseled regarding possible photosensitivity and increased risk for sunburn.  Patient instructed to avoid sunlight, if possible.  When exposed to sunlight, patients should wear protective clothing, sunglasses, and sunscreen.  The patient was instructed to call the office immediately if the following severe adverse effects occur:  hearing changes, easy bruising/bleeding, severe headache, or vision changes.  The patient verbalized understanding of the proper use and possible adverse effects of tetracycline.  All of the patient's questions and concerns were addressed. Patient understands to avoid pregnancy while on therapy due to potential birth defects.
Taltz Counseling: I discussed with the patient the risks of ixekizumab including but not limited to immunosuppression, serious infections, worsening of inflammatory bowel disease and drug reactions.  The patient understands that monitoring is required including a PPD at baseline and must alert us or the primary physician if symptoms of infection or other concerning signs are noted.
Azelaic Acid Counseling: Patient counseled that medicine may cause skin irritation and to avoid applying near the eyes.  In the event of skin irritation, the patient was advised to reduce the amount of the drug applied or use it less frequently.   The patient verbalized understanding of the proper use and possible adverse effects of azelaic acid.  All of the patient's questions and concerns were addressed.
Aklief counseling:  Patient advised to apply a pea-sized amount only at bedtime and wait 30 minutes after washing their face before applying.  If too drying, patient may add a non-comedogenic moisturizer.  The most commonly reported side effects including irritation, redness, scaling, dryness, stinging, burning, itching, and increased risk of sunburn.  The patient verbalized understanding of the proper use and possible adverse effects of retinoids.  All of the patient's questions and concerns were addressed.
Topical Sulfur Applications Counseling: Topical Sulfur Counseling: Patient counseled that this medication may cause skin irritation or allergic reactions.  In the event of skin irritation, the patient was advised to reduce the amount of the drug applied or use it less frequently.   The patient verbalized understanding of the proper use and possible adverse effects of topical sulfur application.  All of the patient's questions and concerns were addressed.
Arava Counseling:  Patient counseled regarding adverse effects of Arava including but not limited to nausea, vomiting, abnormalities in liver function tests. Patients may develop mouth sores, rash, diarrhea, and abnormalities in blood counts. The patient understands that monitoring is required including LFTs and blood counts.  There is a rare possibility of scarring of the liver and lung problems that can occur when taking methotrexate. Persistent nausea, loss of appetite, pale stools, dark urine, cough, and shortness of breath should be reported immediately. Patient advised to discontinue Arava treatment and consult with a physician prior to attempting conception. The patient will have to undergo a treatment to eliminate Arava from the body prior to conception.
Nsaids Counseling: NSAID Counseling: I discussed with the patient that NSAIDs should be taken with food. Prolonged use of NSAIDs can result in the development of stomach ulcers.  Patient advised to stop taking NSAIDs if abdominal pain occurs.  The patient verbalized understanding of the proper use and possible adverse effects of NSAIDs.  All of the patient's questions and concerns were addressed.
Cephalexin Pregnancy And Lactation Text: This medication is Pregnancy Category B and considered safe during pregnancy.  It is also excreted in breast milk but can be used safely for shorter doses.
Siliq Counseling:  I discussed with the patient the risks of Siliq including but not limited to new or worsening depression, suicidal thoughts and behavior, immunosuppression, malignancy, posterior leukoencephalopathy syndrome, and serious infections.  The patient understands that monitoring is required including a PPD at baseline and must alert us or the primary physician if symptoms of infection or other concerning signs are noted. There is also a special program designed to monitor depression which is required with Siliq.
Opzelura Counseling:  I discussed with the patient the risks of Elnoria Haw including but not limited to nasopharngitis, bronchitis, ear infection, eosinophila, hives, diarrhea, folliculitis, tonsillitis, and rhinorrhea. Taken orally, this medication has been linked to serious infections; higher rate of mortality; malignancy and lymphoproliferative disorders; major adverse cardiovascular events; thrombosis; thrombocytopenia, anemia, and neutropenia; and lipid elevations.
Minocycline Counseling: Patient advised regarding possible photosensitivity and discoloration of the teeth, skin, lips, tongue and gums.  Patient instructed to avoid sunlight, if possible.  When exposed to sunlight, patients should wear protective clothing, sunglasses, and sunscreen.  The patient was instructed to call the office immediately if the following severe adverse effects occur:  hearing changes, easy bruising/bleeding, severe headache, or vision changes.  The patient verbalized understanding of the proper use and possible adverse effects of minocycline.  All of the patient's questions and concerns were addressed.
Valtrex Counseling: I discussed with the patient the risks of valacyclovir including but not limited to kidney damage, nausea, vomiting and severe allergy.  The patient understands that if the infection seems to be worsening or is not improving, they are to call.
Acitretin Counseling:  I discussed with the patient the risks of acitretin including but not limited to hair loss, dry lips/skin/eyes, liver damage, hyperlipidemia, depression/suicidal ideation, photosensitivity.  Serious rare side effects can include but are not limited to pancreatitis, pseudotumor cerebri, bony changes, clot formation/stroke/heart attack.  Patient understands that alcohol is contraindicated since it can result in liver toxicity and significantly prolong the elimination of the drug by many years.
Ilumya Counseling: I discussed with the patient the risks of tildrakizumab including but not limited to immunosuppression, malignancy, posterior leukoencephalopathy syndrome, and serious infections.  The patient understands that monitoring is required including a PPD at baseline and must alert us or the primary physician if symptoms of infection or other concerning signs are noted.
Imiquimod Counseling:  I discussed with the patient the risks of imiquimod including but not limited to erythema, scaling, itching, weeping, crusting, and pain.  Patient understands that the inflammatory response to imiquimod is variable from person to person and was educated regarded proper titration schedule.  If flu-like symptoms develop, patient knows to discontinue the medication and contact us.
Topical Clindamycin Counseling: Patient counseled that this medication may cause skin irritation or allergic reactions.  In the event of skin irritation, the patient was advised to reduce the amount of the drug applied or use it less frequently.   The patient verbalized understanding of the proper use and possible adverse effects of clindamycin.  All of the patient's questions and concerns were addressed.
Olumiant Counseling: I discussed with the patient the risks of Olumiant therapy including but not limited to upper respiratory tract infections, shingles, cold sores, and nausea. Live vaccines should be avoided.  This medication has been linked to serious infections; higher rate of mortality; malignancy and lymphoproliferative disorders; major adverse cardiovascular events; thrombosis; gastrointestinal perforations; neutropenia; lymphopenia; anemia; liver enzyme elevations; and lipid elevations.
Zyclara Counseling:  I discussed with the patient the risks of imiquimod including but not limited to erythema, scaling, itching, weeping, crusting, and pain.  Patient understands that the inflammatory response to imiquimod is variable from person to person and was educated regarded proper titration schedule.  If flu-like symptoms develop, patient knows to discontinue the medication and contact us.
Aklief Pregnancy And Lactation Text: It is unknown if this medication is safe to use during pregnancy.  It is unknown if this medication is excreted in breast milk.  Breastfeeding women should use the topical cream on the smallest area of the skin for the shortest time needed while breastfeeding.  Do not apply to nipple and areola.
Opzelura Pregnancy And Lactation Text: There is insufficient data to evaluate drug-associated risk for major birth defects, miscarriage, or other adverse maternal or fetal outcomes.  There is a pregnancy registry that monitors pregnancy outcomes in pregnant persons exposed to the medication during pregnancy.  It is unknown if this medication is excreted in breast milk.  Do not breastfeed during treatment and for about 4 weeks after the last dose.
Odomzo Counseling- I discussed with the patient the risks of Odomzo including but not limited to nausea, vomiting, diarrhea, constipation, weight loss, changes in the sense of taste, decreased appetite, muscle spasms, and hair loss.  The patient verbalized understanding of the proper use and possible adverse effects of Odomzo.  All of the patient's questions and concerns were addressed.
Terbinafine Counseling: Patient counseling regarding adverse effects of terbinafine including but not limited to headache, diarrhea, rash, upset stomach, liver function test abnormalities, itching, taste/smell disturbance, nausea, abdominal pain, and flatulence.  There is a rare possibility of liver failure that can occur when taking terbinafine.  The patient understands that a baseline LFT and kidney function test may be required. The patient verbalized understanding of the proper use and possible adverse effects of terbinafine.  All of the patient's questions and concerns were addressed.
Spironolactone Counseling: Patient advised regarding risks of diarrhea, abdominal pain, hyperkalemia, birth defects (for female patients), liver toxicity and renal toxicity. The patient may need blood work to monitor liver and kidney function and potassium levels while on therapy. The patient verbalized understanding of the proper use and possible adverse effects of spironolactone.  All of the patient's questions and concerns were addressed.
Olumiant Pregnancy And Lactation Text: Based on animal studies, Eve Mattock may cause embryo-fetal harm when administered to pregnant women. The medication should not be used in pregnancy. Breastfeeding is not recommended during treatment.
Albendazole Counseling:  I discussed with the patient the risks of albendazole including but not limited to cytopenia, kidney damage, nausea/vomiting and severe allergy.  The patient understands that this medication is being used in an off-label manner.
Tremfya Counseling: I discussed with the patient the risks of guselkumab including but not limited to immunosuppression, serious infections, and drug reactions.  The patient understands that monitoring is required including a PPD at baseline and must alert us or the primary physician if symptoms of infection or other concerning signs are noted.
Cosentyx Counseling:  I discussed with the patient the risks of Cosentyx including but not limited to worsening of Crohn's disease, immunosuppression, allergic reactions and infections.  The patient understands that monitoring is required including a PPD at baseline and must alert us or the primary physician if symptoms of infection or other concerning signs are noted.
Simponi Counseling:  I discussed with the patient the risks of golimumab including but not limited to myelosuppression, immunosuppression, autoimmune hepatitis, demyelinating diseases, lymphoma, and serious infections.  The patient understands that monitoring is required including a PPD at baseline and must alert us or the primary physician if symptoms of infection or other concerning signs are noted.
Bexarotene Counseling:  I discussed with the patient the risks of bexarotene including but not limited to hair loss, dry lips/skin/eyes, liver abnormalities, hyperlipidemia, pancreatitis, depression/suicidal ideation, photosensitivity, drug rash/allergic reactions, hypothyroidism, anemia, leukopenia, infection, cataracts, and teratogenicity.  Patient understands that they will need regular blood tests to check lipid profile, liver function tests, white blood cell count, thyroid function tests and pregnancy test if applicable.
Benzoyl Peroxide Counseling: Patient counseled that medicine may cause skin irritation and bleach clothing.  In the event of skin irritation, the patient was advised to reduce the amount of the drug applied or use it less frequently.   The patient verbalized understanding of the proper use and possible adverse effects of benzoyl peroxide.  All of the patient's questions and concerns were addressed.
Olanzapine Counseling- I discussed with the patient the common side effects of olanzapine including but are not limited to: lack of energy, dry mouth, increased appetite, sleepiness, tremor, constipation, dizziness, changes in behavior, or restlessness.  Explained that teenagers are more likely to experience headaches, abdominal pain, pain in the arms or legs, tiredness, and sleepiness.  Serious side effects include but are not limited: increased risk of death in elderly patients who are confused, have memory loss, or dementia-related psychosis; hyperglycemia; increased cholesterol and triglycerides; and weight gain.
Fluconazole Counseling:  Patient counseled regarding adverse effects of fluconazole including but not limited to headache, diarrhea, nausea, upset stomach, liver function test abnormalities, taste disturbance, and stomach pain.  There is a rare possibility of liver failure that can occur when taking fluconazole.  The patient understands that monitoring of LFTs and kidney function test may be required, especially at baseline. The patient verbalized understanding of the proper use and possible adverse effects of fluconazole.  All of the patient's questions and concerns were addressed.

## 2023-06-22 RX ORDER — CLINDAMYCIN HYDROCHLORIDE 300 MG/1
1 CAPSULE ORAL BID
Qty: 8 | Refills: 0 | Status: ERX

## 2023-07-03 ENCOUNTER — APPOINTMENT (RX ONLY)
Dept: URBAN - METROPOLITAN AREA CLINIC 106 | Facility: CLINIC | Age: 43
Setting detail: DERMATOLOGY
End: 2023-07-03

## 2023-07-03 DIAGNOSIS — L08.9 LOCAL INFECTION OF THE SKIN AND SUBCUTANEOUS TISSUE, UNSPECIFIED: ICD-10-CM | Status: RESOLVED

## 2023-07-03 DIAGNOSIS — L29.89 OTHER PRURITUS: ICD-10-CM | Status: RESOLVED

## 2023-07-03 DIAGNOSIS — L30.2 CUTANEOUS AUTOSENSITIZATION: ICD-10-CM | Status: RESOLVING

## 2023-07-03 PROBLEM — L29.8 OTHER PRURITUS: Status: ACTIVE | Noted: 2023-07-03

## 2023-07-03 PROCEDURE — ? ADDITIONAL NOTES

## 2023-07-03 PROCEDURE — ? PHOTO-DOCUMENTATION

## 2023-07-03 PROCEDURE — 99213 OFFICE O/P EST LOW 20 MIN: CPT

## 2023-07-03 PROCEDURE — ? COUNSELING

## 2023-07-03 PROCEDURE — ? TREATMENT REGIMEN

## 2023-07-03 ASSESSMENT — LOCATION ZONE DERM
LOCATION ZONE: LEG
LOCATION ZONE: ARM

## 2023-07-03 ASSESSMENT — ITCH INTENSITY: HOW SEVERE IS YOUR ITCHING?: 4

## 2023-07-03 ASSESSMENT — LOCATION DETAILED DESCRIPTION DERM
LOCATION DETAILED: RIGHT DISTAL PRETIBIAL REGION
LOCATION DETAILED: LEFT DISTAL DORSAL FOREARM
LOCATION DETAILED: RIGHT PROXIMAL DORSAL FOREARM
LOCATION DETAILED: RIGHT ANTERIOR DISTAL THIGH

## 2023-07-03 ASSESSMENT — LOCATION SIMPLE DESCRIPTION DERM
LOCATION SIMPLE: RIGHT THIGH
LOCATION SIMPLE: RIGHT PRETIBIAL REGION
LOCATION SIMPLE: RIGHT FOREARM
LOCATION SIMPLE: LEFT FOREARM

## 2023-07-03 NOTE — PROCEDURE: TREATMENT REGIMEN
Detail Level: Simple
Plan: Pea sized amount of aqu
Otc Regimen: Application of ice recommended on the affected area.

## 2023-07-03 NOTE — PROCEDURE: ADDITIONAL NOTES
Additional Notes: reviewed C&S report \\nPatient completed and tolerated oral Clindamycin
Render Risk Assessment In Note?: no
Detail Level: Detailed

## 2023-07-10 ENCOUNTER — APPOINTMENT (RX ONLY)
Dept: URBAN - METROPOLITAN AREA CLINIC 106 | Facility: CLINIC | Age: 43
Setting detail: DERMATOLOGY
End: 2023-07-10

## 2023-07-10 DIAGNOSIS — L08.9 LOCAL INFECTION OF THE SKIN AND SUBCUTANEOUS TISSUE, UNSPECIFIED: ICD-10-CM

## 2023-07-10 PROCEDURE — ? COUNSELING

## 2023-07-10 PROCEDURE — 99212 OFFICE O/P EST SF 10 MIN: CPT

## 2023-07-10 PROCEDURE — ? PRESCRIPTION

## 2023-07-10 RX ORDER — CLINDAMYCIN PHOSPHATE 10 MG/G
1 GEL TOPICAL BID
Qty: 60 | Refills: 0 | Status: ERX | COMMUNITY
Start: 2023-07-10

## 2023-07-10 RX ADMIN — CLINDAMYCIN PHOSPHATE 1: 10 GEL TOPICAL at 00:00

## 2023-07-10 ASSESSMENT — LOCATION DETAILED DESCRIPTION DERM: LOCATION DETAILED: RIGHT DISTAL PRETIBIAL REGION

## 2023-07-10 ASSESSMENT — LOCATION ZONE DERM: LOCATION ZONE: LEG

## 2023-07-10 ASSESSMENT — LOCATION SIMPLE DESCRIPTION DERM: LOCATION SIMPLE: RIGHT PRETIBIAL REGION

## 2023-07-26 ENCOUNTER — APPOINTMENT (RX ONLY)
Dept: URBAN - METROPOLITAN AREA CLINIC 106 | Facility: CLINIC | Age: 43
Setting detail: DERMATOLOGY
End: 2023-07-26

## 2023-07-26 DIAGNOSIS — L30.9 DERMATITIS, UNSPECIFIED: ICD-10-CM

## 2023-07-26 PROCEDURE — 99212 OFFICE O/P EST SF 10 MIN: CPT

## 2023-07-26 PROCEDURE — ? PRESCRIPTION MEDICATION MANAGEMENT

## 2023-07-26 ASSESSMENT — ITCH NUMERIC RATING SCALE: HOW SEVERE IS YOUR ITCHING?: 4

## 2023-07-26 ASSESSMENT — LOCATION DETAILED DESCRIPTION DERM: LOCATION DETAILED: RIGHT PROXIMAL PRETIBIAL REGION

## 2023-07-26 ASSESSMENT — BSA RASH: BSA RASH: 1

## 2023-07-26 ASSESSMENT — LOCATION SIMPLE DESCRIPTION DERM: LOCATION SIMPLE: RIGHT PRETIBIAL REGION

## 2023-07-26 ASSESSMENT — LOCATION ZONE DERM: LOCATION ZONE: LEG

## 2023-07-26 NOTE — PROCEDURE: PRESCRIPTION MEDICATION MANAGEMENT
Detail Level: Zone
Modify Regimen: Discontinue topical steroid and only use it as needed if the rash returns prn
Render In Strict Bullet Format?: No